# Patient Record
Sex: FEMALE | Race: OTHER | NOT HISPANIC OR LATINO | URBAN - METROPOLITAN AREA
[De-identification: names, ages, dates, MRNs, and addresses within clinical notes are randomized per-mention and may not be internally consistent; named-entity substitution may affect disease eponyms.]

---

## 2022-05-21 ENCOUNTER — EMERGENCY (EMERGENCY)
Facility: HOSPITAL | Age: 77
LOS: 1 days | Discharge: HOME | End: 2022-05-21
Attending: EMERGENCY MEDICINE
Payer: MEDICARE

## 2022-05-21 VITALS
HEART RATE: 70 BPM | TEMPERATURE: 99 F | SYSTOLIC BLOOD PRESSURE: 145 MMHG | RESPIRATION RATE: 17 BRPM | HEIGHT: 65 IN | WEIGHT: 119.93 LBS | OXYGEN SATURATION: 97 % | DIASTOLIC BLOOD PRESSURE: 74 MMHG

## 2022-05-21 LAB
ALBUMIN SERPL ELPH-MCNC: 2.8 G/DL — LOW (ref 3.5–5)
ALP SERPL-CCNC: 117 U/L — SIGNIFICANT CHANGE UP (ref 40–120)
ALT FLD-CCNC: 90 U/L DA — HIGH (ref 10–60)
ANION GAP SERPL CALC-SCNC: 5 MMOL/L — SIGNIFICANT CHANGE UP (ref 5–17)
AST SERPL-CCNC: 41 U/L — HIGH (ref 10–40)
BASOPHILS # BLD AUTO: 0.07 K/UL — SIGNIFICANT CHANGE UP (ref 0–0.2)
BASOPHILS NFR BLD AUTO: 1 % — SIGNIFICANT CHANGE UP (ref 0–2)
BILIRUB SERPL-MCNC: 0.2 MG/DL — SIGNIFICANT CHANGE UP (ref 0.2–1.2)
BUN SERPL-MCNC: 23 MG/DL — HIGH (ref 7–18)
CALCIUM SERPL-MCNC: 9.1 MG/DL — SIGNIFICANT CHANGE UP (ref 8.4–10.5)
CHLORIDE SERPL-SCNC: 109 MMOL/L — HIGH (ref 96–108)
CK SERPL-CCNC: 60 U/L — SIGNIFICANT CHANGE UP (ref 21–215)
CO2 SERPL-SCNC: 29 MMOL/L — SIGNIFICANT CHANGE UP (ref 22–31)
CREAT SERPL-MCNC: 0.95 MG/DL — SIGNIFICANT CHANGE UP (ref 0.5–1.3)
EGFR: 63 ML/MIN/1.73M2 — SIGNIFICANT CHANGE UP
EOSINOPHIL # BLD AUTO: 0.76 K/UL — HIGH (ref 0–0.5)
EOSINOPHIL NFR BLD AUTO: 11.1 % — HIGH (ref 0–6)
GLUCOSE SERPL-MCNC: 115 MG/DL — HIGH (ref 70–99)
HCT VFR BLD CALC: 34.7 % — SIGNIFICANT CHANGE UP (ref 34.5–45)
HGB BLD-MCNC: 10.9 G/DL — LOW (ref 11.5–15.5)
IMM GRANULOCYTES NFR BLD AUTO: 0.1 % — SIGNIFICANT CHANGE UP (ref 0–1.5)
LYMPHOCYTES # BLD AUTO: 1.71 K/UL — SIGNIFICANT CHANGE UP (ref 1–3.3)
LYMPHOCYTES # BLD AUTO: 24.9 % — SIGNIFICANT CHANGE UP (ref 13–44)
MCHC RBC-ENTMCNC: 31.4 GM/DL — LOW (ref 32–36)
MCHC RBC-ENTMCNC: 31.5 PG — SIGNIFICANT CHANGE UP (ref 27–34)
MCV RBC AUTO: 100.3 FL — HIGH (ref 80–100)
MONOCYTES # BLD AUTO: 0.65 K/UL — SIGNIFICANT CHANGE UP (ref 0–0.9)
MONOCYTES NFR BLD AUTO: 9.5 % — SIGNIFICANT CHANGE UP (ref 2–14)
NEUTROPHILS # BLD AUTO: 3.66 K/UL — SIGNIFICANT CHANGE UP (ref 1.8–7.4)
NEUTROPHILS NFR BLD AUTO: 53.4 % — SIGNIFICANT CHANGE UP (ref 43–77)
NRBC # BLD: 0 /100 WBCS — SIGNIFICANT CHANGE UP (ref 0–0)
PLATELET # BLD AUTO: 227 K/UL — SIGNIFICANT CHANGE UP (ref 150–400)
POTASSIUM SERPL-MCNC: 4.4 MMOL/L — SIGNIFICANT CHANGE UP (ref 3.5–5.3)
POTASSIUM SERPL-SCNC: 4.4 MMOL/L — SIGNIFICANT CHANGE UP (ref 3.5–5.3)
PROT SERPL-MCNC: 6.1 G/DL — SIGNIFICANT CHANGE UP (ref 6–8.3)
RBC # BLD: 3.46 M/UL — LOW (ref 3.8–5.2)
RBC # FLD: 13 % — SIGNIFICANT CHANGE UP (ref 10.3–14.5)
SODIUM SERPL-SCNC: 143 MMOL/L — SIGNIFICANT CHANGE UP (ref 135–145)
WBC # BLD: 6.86 K/UL — SIGNIFICANT CHANGE UP (ref 3.8–10.5)
WBC # FLD AUTO: 6.86 K/UL — SIGNIFICANT CHANGE UP (ref 3.8–10.5)

## 2022-05-21 PROCEDURE — 82550 ASSAY OF CK (CPK): CPT

## 2022-05-21 PROCEDURE — 80053 COMPREHEN METABOLIC PANEL: CPT

## 2022-05-21 PROCEDURE — 96374 THER/PROPH/DIAG INJ IV PUSH: CPT

## 2022-05-21 PROCEDURE — 73502 X-RAY EXAM HIP UNI 2-3 VIEWS: CPT | Mod: 26,LT

## 2022-05-21 PROCEDURE — 73502 X-RAY EXAM HIP UNI 2-3 VIEWS: CPT

## 2022-05-21 PROCEDURE — 36415 COLL VENOUS BLD VENIPUNCTURE: CPT

## 2022-05-21 PROCEDURE — 73552 X-RAY EXAM OF FEMUR 2/>: CPT | Mod: 26,LT

## 2022-05-21 PROCEDURE — 99284 EMERGENCY DEPT VISIT MOD MDM: CPT | Mod: 25

## 2022-05-21 PROCEDURE — 85025 COMPLETE CBC W/AUTO DIFF WBC: CPT

## 2022-05-21 PROCEDURE — 73552 X-RAY EXAM OF FEMUR 2/>: CPT

## 2022-05-21 PROCEDURE — 99284 EMERGENCY DEPT VISIT MOD MDM: CPT

## 2022-05-21 RX ORDER — KETOROLAC TROMETHAMINE 30 MG/ML
15 SYRINGE (ML) INJECTION ONCE
Refills: 0 | Status: DISCONTINUED | OUTPATIENT
Start: 2022-05-21 | End: 2022-05-21

## 2022-05-21 RX ADMIN — Medication 15 MILLIGRAM(S): at 21:15

## 2022-05-21 RX ADMIN — Medication 15 MILLIGRAM(S): at 21:45

## 2022-05-21 NOTE — ED PROVIDER NOTE - OBJECTIVE STATEMENT
76 yr old female from [facility] with hx of HTN, HLD presents to ed c/o left hip and femur pain x couple days. no trauma, no numbness or tingling. pt states worse with movement. no back pain.

## 2022-05-21 NOTE — ED PROVIDER NOTE - NEUROLOGICAL, MLM
Alert and oriented, no focal deficits, no motor or sensory deficits. ambulate with slight limp on left

## 2022-05-21 NOTE — ED PROVIDER NOTE - PROGRESS NOTE DETAILS
Chaudhry: work up neg. xr no acute fx. ck normal. normal lytes, pt neurovascularly intact  dx left leg pain possibly mild arthritis. please obtain PT and ortho as needed. fall precaution. ambulette

## 2022-05-21 NOTE — ED PROVIDER NOTE - MUSCULOSKELETAL, MLM
Spine appears normal, range of motion is not limited, no muscle. left hip pain but no deformity or erythema.

## 2022-05-21 NOTE — ED ADULT NURSE NOTE - OBJECTIVE STATEMENT
Pt presents to ED with c/o left leg pain. Pt is alert and oriented to person and place. Pt denies any discomfort.

## 2022-05-21 NOTE — ED PROVIDER NOTE - CLINICAL SUMMARY MEDICAL DECISION MAKING FREE TEXT BOX
76 yr old female from [facility] with hx of HTN, HLD presents to ed c/o left hip and femur pain x couple days. no trauma, no numbness or tingling. pt states worse with movement. no back pain.    likely OA r/o hip fx vs rhabdo- toradol, labs, xr, re-assess

## 2022-05-21 NOTE — ED ADULT NURSE NOTE - NSIMPLEMENTINTERV_GEN_ALL_ED
Implemented All Fall Risk Interventions:  [place] to call system. Call bell, personal items and telephone within reach. Instruct patient to call for assistance. Room bathroom lighting operational. Non-slip footwear when patient is off stretcher. Physically safe environment: no spills, clutter or unnecessary equipment. Stretcher in lowest position, wheels locked, appropriate side rails in place. Provide visual cue, wrist band, yellow gown, etc. Monitor gait and stability. Monitor for mental status changes and reorient to person, place, and time. Review medications for side effects contributing to fall risk. Reinforce activity limits and safety measures with patient and family.

## 2022-05-21 NOTE — ED PROVIDER NOTE - PATIENT PORTAL LINK FT
You can access the FollowMyHealth Patient Portal offered by [facility] by registering at the following website: [email or web address]. By joining [employer]’s FollowMyHealth portal, you will also be able to view your health information using other applications (apps) compatible with our system.

## 2022-05-22 VITALS
TEMPERATURE: 98 F | OXYGEN SATURATION: 98 % | SYSTOLIC BLOOD PRESSURE: 145 MMHG | DIASTOLIC BLOOD PRESSURE: 63 MMHG | HEART RATE: 63 BPM | RESPIRATION RATE: 17 BRPM

## 2022-06-04 ENCOUNTER — TELEPHONE ENCOUNTER (OUTPATIENT)
Dept: URBAN - METROPOLITAN AREA CLINIC 68 | Facility: CLINIC | Age: 77
End: 2022-06-04

## 2022-06-05 ENCOUNTER — TELEPHONE ENCOUNTER (OUTPATIENT)
Dept: URBAN - METROPOLITAN AREA CLINIC 68 | Facility: CLINIC | Age: 77
End: 2022-06-05

## 2022-06-25 ENCOUNTER — TELEPHONE ENCOUNTER (OUTPATIENT)
Age: 77
End: 2022-06-25

## 2022-06-26 ENCOUNTER — TELEPHONE ENCOUNTER (OUTPATIENT)
Age: 77
End: 2022-06-26

## 2023-03-14 ENCOUNTER — EMERGENCY (EMERGENCY)
Facility: HOSPITAL | Age: 78
LOS: 0 days | Discharge: HOME | End: 2023-03-15
Attending: STUDENT IN AN ORGANIZED HEALTH CARE EDUCATION/TRAINING PROGRAM
Payer: MEDICARE

## 2023-03-14 VITALS
RESPIRATION RATE: 20 BRPM | OXYGEN SATURATION: 98 % | HEART RATE: 74 BPM | DIASTOLIC BLOOD PRESSURE: 80 MMHG | SYSTOLIC BLOOD PRESSURE: 190 MMHG | TEMPERATURE: 98 F

## 2023-03-14 LAB
ALBUMIN SERPL ELPH-MCNC: 4.1 G/DL — SIGNIFICANT CHANGE UP (ref 3.5–5.2)
ALP SERPL-CCNC: 115 U/L — SIGNIFICANT CHANGE UP (ref 30–115)
ALT FLD-CCNC: 15 U/L — SIGNIFICANT CHANGE UP (ref 0–41)
ANION GAP SERPL CALC-SCNC: 11 MMOL/L — SIGNIFICANT CHANGE UP (ref 7–14)
AST SERPL-CCNC: 26 U/L — SIGNIFICANT CHANGE UP (ref 0–41)
BASOPHILS # BLD AUTO: 0.08 K/UL — SIGNIFICANT CHANGE UP (ref 0–0.2)
BASOPHILS NFR BLD AUTO: 1.1 % — HIGH (ref 0–1)
BILIRUB DIRECT SERPL-MCNC: <0.2 MG/DL — SIGNIFICANT CHANGE UP (ref 0–0.3)
BILIRUB INDIRECT FLD-MCNC: SIGNIFICANT CHANGE UP MG/DL (ref 0.2–1.2)
BILIRUB SERPL-MCNC: <0.2 MG/DL — SIGNIFICANT CHANGE UP (ref 0.2–1.2)
BUN SERPL-MCNC: 30 MG/DL — HIGH (ref 10–20)
CALCIUM SERPL-MCNC: 9.6 MG/DL — SIGNIFICANT CHANGE UP (ref 8.4–10.5)
CHLORIDE SERPL-SCNC: 110 MMOL/L — SIGNIFICANT CHANGE UP (ref 98–110)
CO2 SERPL-SCNC: 23 MMOL/L — SIGNIFICANT CHANGE UP (ref 17–32)
CREAT SERPL-MCNC: 0.9 MG/DL — SIGNIFICANT CHANGE UP (ref 0.7–1.5)
EGFR: 67 ML/MIN/1.73M2 — SIGNIFICANT CHANGE UP
EOSINOPHIL # BLD AUTO: 0.14 K/UL — SIGNIFICANT CHANGE UP (ref 0–0.7)
EOSINOPHIL NFR BLD AUTO: 2 % — SIGNIFICANT CHANGE UP (ref 0–8)
GLUCOSE SERPL-MCNC: 89 MG/DL — SIGNIFICANT CHANGE UP (ref 70–99)
HCT VFR BLD CALC: 36.8 % — LOW (ref 37–47)
HGB BLD-MCNC: 11.9 G/DL — LOW (ref 12–16)
IMM GRANULOCYTES NFR BLD AUTO: 0.3 % — SIGNIFICANT CHANGE UP (ref 0.1–0.3)
LIDOCAIN IGE QN: 25 U/L — SIGNIFICANT CHANGE UP (ref 7–60)
LYMPHOCYTES # BLD AUTO: 2 K/UL — SIGNIFICANT CHANGE UP (ref 1.2–3.4)
LYMPHOCYTES # BLD AUTO: 27.9 % — SIGNIFICANT CHANGE UP (ref 20.5–51.1)
MCHC RBC-ENTMCNC: 31.7 PG — HIGH (ref 27–31)
MCHC RBC-ENTMCNC: 32.3 G/DL — SIGNIFICANT CHANGE UP (ref 32–37)
MCV RBC AUTO: 98.1 FL — SIGNIFICANT CHANGE UP (ref 81–99)
MONOCYTES # BLD AUTO: 0.67 K/UL — HIGH (ref 0.1–0.6)
MONOCYTES NFR BLD AUTO: 9.4 % — HIGH (ref 1.7–9.3)
NEUTROPHILS # BLD AUTO: 4.25 K/UL — SIGNIFICANT CHANGE UP (ref 1.4–6.5)
NEUTROPHILS NFR BLD AUTO: 59.3 % — SIGNIFICANT CHANGE UP (ref 42.2–75.2)
NRBC # BLD: 0 /100 WBCS — SIGNIFICANT CHANGE UP (ref 0–0)
PLATELET # BLD AUTO: 258 K/UL — SIGNIFICANT CHANGE UP (ref 130–400)
POTASSIUM SERPL-MCNC: 5.1 MMOL/L — HIGH (ref 3.5–5)
POTASSIUM SERPL-SCNC: 5.1 MMOL/L — HIGH (ref 3.5–5)
PROT SERPL-MCNC: 6.5 G/DL — SIGNIFICANT CHANGE UP (ref 6–8)
RBC # BLD: 3.75 M/UL — LOW (ref 4.2–5.4)
RBC # FLD: 12.8 % — SIGNIFICANT CHANGE UP (ref 11.5–14.5)
SODIUM SERPL-SCNC: 144 MMOL/L — SIGNIFICANT CHANGE UP (ref 135–146)
TROPONIN T SERPL-MCNC: <0.01 NG/ML — SIGNIFICANT CHANGE UP
WBC # BLD: 7.16 K/UL — SIGNIFICANT CHANGE UP (ref 4.8–10.8)
WBC # FLD AUTO: 7.16 K/UL — SIGNIFICANT CHANGE UP (ref 4.8–10.8)

## 2023-03-14 PROCEDURE — 80048 BASIC METABOLIC PNL TOTAL CA: CPT

## 2023-03-14 PROCEDURE — 93005 ELECTROCARDIOGRAM TRACING: CPT

## 2023-03-14 PROCEDURE — 74176 CT ABD & PELVIS W/O CONTRAST: CPT | Mod: MA

## 2023-03-14 PROCEDURE — 99285 EMERGENCY DEPT VISIT HI MDM: CPT | Mod: 25

## 2023-03-14 PROCEDURE — 96374 THER/PROPH/DIAG INJ IV PUSH: CPT

## 2023-03-14 PROCEDURE — A9500: CPT

## 2023-03-14 PROCEDURE — 85025 COMPLETE CBC W/AUTO DIFF WBC: CPT

## 2023-03-14 PROCEDURE — 80076 HEPATIC FUNCTION PANEL: CPT

## 2023-03-14 PROCEDURE — 96372 THER/PROPH/DIAG INJ SC/IM: CPT | Mod: XU

## 2023-03-14 PROCEDURE — 36415 COLL VENOUS BLD VENIPUNCTURE: CPT

## 2023-03-14 PROCEDURE — G0378: CPT

## 2023-03-14 PROCEDURE — 84484 ASSAY OF TROPONIN QUANT: CPT

## 2023-03-14 PROCEDURE — 99223 1ST HOSP IP/OBS HIGH 75: CPT

## 2023-03-14 PROCEDURE — 87635 SARS-COV-2 COVID-19 AMP PRB: CPT

## 2023-03-14 PROCEDURE — 78452 HT MUSCLE IMAGE SPECT MULT: CPT | Mod: MA

## 2023-03-14 PROCEDURE — 93010 ELECTROCARDIOGRAM REPORT: CPT

## 2023-03-14 PROCEDURE — 74176 CT ABD & PELVIS W/O CONTRAST: CPT | Mod: 26,MA

## 2023-03-14 PROCEDURE — 93017 CV STRESS TEST TRACING ONLY: CPT

## 2023-03-14 PROCEDURE — 71045 X-RAY EXAM CHEST 1 VIEW: CPT | Mod: 26

## 2023-03-14 PROCEDURE — 83690 ASSAY OF LIPASE: CPT

## 2023-03-14 PROCEDURE — 93010 ELECTROCARDIOGRAM REPORT: CPT | Mod: 77

## 2023-03-14 PROCEDURE — 71045 X-RAY EXAM CHEST 1 VIEW: CPT

## 2023-03-14 RX ORDER — MIDAZOLAM HYDROCHLORIDE 1 MG/ML
2 INJECTION, SOLUTION INTRAMUSCULAR; INTRAVENOUS ONCE
Refills: 0 | Status: DISCONTINUED | OUTPATIENT
Start: 2023-03-14 | End: 2023-03-14

## 2023-03-14 RX ORDER — ASPIRIN/CALCIUM CARB/MAGNESIUM 324 MG
81 TABLET ORAL DAILY
Refills: 0 | Status: DISCONTINUED | OUTPATIENT
Start: 2023-03-14 | End: 2023-03-15

## 2023-03-14 RX ORDER — SERTRALINE 25 MG/1
25 TABLET, FILM COATED ORAL DAILY
Refills: 0 | Status: DISCONTINUED | OUTPATIENT
Start: 2023-03-14 | End: 2023-03-15

## 2023-03-14 RX ORDER — AMLODIPINE BESYLATE 2.5 MG/1
10 TABLET ORAL ONCE
Refills: 0 | Status: DISCONTINUED | OUTPATIENT
Start: 2023-03-14 | End: 2023-03-15

## 2023-03-14 RX ORDER — LOSARTAN POTASSIUM 100 MG/1
50 TABLET, FILM COATED ORAL DAILY
Refills: 0 | Status: DISCONTINUED | OUTPATIENT
Start: 2023-03-14 | End: 2023-03-15

## 2023-03-14 RX ORDER — DIAZEPAM 5 MG
5 TABLET ORAL ONCE
Refills: 0 | Status: DISCONTINUED | OUTPATIENT
Start: 2023-03-14 | End: 2023-03-14

## 2023-03-14 RX ORDER — DONEPEZIL HYDROCHLORIDE 10 MG/1
10 TABLET, FILM COATED ORAL AT BEDTIME
Refills: 0 | Status: DISCONTINUED | OUTPATIENT
Start: 2023-03-14 | End: 2023-03-15

## 2023-03-14 RX ADMIN — MIDAZOLAM HYDROCHLORIDE 2 MILLIGRAM(S): 1 INJECTION, SOLUTION INTRAMUSCULAR; INTRAVENOUS at 21:17

## 2023-03-14 RX ADMIN — Medication 5 MILLIGRAM(S): at 19:55

## 2023-03-14 RX ADMIN — MIDAZOLAM HYDROCHLORIDE 2 MILLIGRAM(S): 1 INJECTION, SOLUTION INTRAMUSCULAR; INTRAVENOUS at 22:06

## 2023-03-14 NOTE — ED CDU PROVIDER INITIAL DAY NOTE - CLINICAL SUMMARY MEDICAL DECISION MAKING FREE TEXT BOX
Labs and EKG were ordered and reviewed.  Imaging was ordered and reviewed by me.  Appropriate medications for patient's presenting complaints were ordered and effects were reassessed.  Patient's records (prior hospital, ED visit, and/or nursing home notes if available) were reviewed.  Additional history was obtained from EMS, family, and/or PCP (where available).  Escalation to admission/observation was considered.  Patient requires hospitalization - monitored setting in obs for full cardiac workup.

## 2023-03-14 NOTE — ED PROVIDER NOTE - PROGRESS NOTE DETAILS
patient's son, Eb: [phone] patient's son, Husam: [phone]    discussed case with patient's son, husam, who states patient with chronic abd pain/back pain. patient without hx cardiac workup. non-smoker. will place in obs for stress CK: patient's son, Husam: [phone]    discussed case with patient's son, husam, who states patient with chronic abd pain/back pain. patient without hx cardiac workup. non-smoker. will place in obs for stress

## 2023-03-14 NOTE — ED PROVIDER NOTE - OBJECTIVE STATEMENT
77 year old female, past medical history htn, depression, dementia, bib ems from assisted living with abd pain. patient reports chest pain radiating to abd pain. as per son, patient with chronic abd pain/back pain. no hx cardiac workup. hx hysterctomy. no fever, vomiting, diarrhea, syncope.

## 2023-03-14 NOTE — ED PROVIDER NOTE - CLINICAL SUMMARY MEDICAL DECISION MAKING FREE TEXT BOX
77-year-old female with past medical history of HTN, HLD, CKD, and Alzheimer's (from nursing home), hysterectomy presents to the ED for chest pain and upper abdominal pain.  Patient very poor historian and majority of history from nursing home notes and also from son (LUDIVINA Parson spoke to him).  No history of fever, shortness of breath, cough, nausea/vomiting/diarrhea. dizziness, leg swelling, calf tenderness,.  Patient apparently with chronic back problems.  Patient with no history of a cardiac work-up.    Patient began sundowning while in the ER.  Given benzodiazepines as she became very agitated, aggressive with nursing staff, trying to pull things off her body (clothing, IV) and therefore given a benzodiazepine to calm down her agitation.    CT abdomen pelvis negative for acute pathology.  ACS work-up initiated and would recommend patient be placed in OBS for completion of the workup, with stress testing, as she has never had a cardiac work-up in the past.

## 2023-03-14 NOTE — ED ADULT TRIAGE NOTE - CHIEF COMPLAINT QUOTE
BIBA from assisted living for chest pain that began today, pt has dementia, aox2, disoriented to person

## 2023-03-14 NOTE — ED PROVIDER NOTE - ATTENDING APP SHARED VISIT CONTRIBUTION OF CARE
77-year-old female with past medical history of HTN, HLD, CKD, and Alzheimer's (from nursing home), hysterectomy presents to the ED for chest pain and upper abdominal pain.  Patient very poor historian and majority of history from nursing home notes and also from son (LUDIVINA Parson spoke to him).  No history of fever, shortness of breath, cough, nausea/vomiting/diarrhea. dizziness, leg swelling, calf tenderness,.  Patient apparently with chronic back problems.  Patient with no history of a cardiac work-up.    On exam patient with + epigastric and left upper quadrant tenderness, no mass, no rebound or guarding, + BS, nondistended.  Remainder exam as per PA note.  Agree with management.  Will check labs, EKG, x-ray, CT scan abdomen pelvis, UA and reassess

## 2023-03-14 NOTE — ED PROVIDER NOTE - PHYSICAL EXAMINATION
CONSTITUTIONAL: agitated, elderly appearing female, nad  SKIN: skin exam is warm and dry  EYES: PERRL, conjunctiva and sclera clear.  ENT: MMM  NECK:  ROM intact.  CARD: S1, S2 normal, no murmur  RESP: No wheezes, rales or rhonchi. Good air movement bilaterally  ABD: soft; non-distended; +epigastric, +LUQ TTP, no rebound/guarding  EXT: Normal ROM.   NEURO: awake, following commands, no focal deficits

## 2023-03-15 VITALS
OXYGEN SATURATION: 98 % | SYSTOLIC BLOOD PRESSURE: 124 MMHG | RESPIRATION RATE: 18 BRPM | HEART RATE: 75 BPM | DIASTOLIC BLOOD PRESSURE: 54 MMHG

## 2023-03-15 LAB
SARS-COV-2 RNA SPEC QL NAA+PROBE: SIGNIFICANT CHANGE UP
TROPONIN T SERPL-MCNC: <0.01 NG/ML — SIGNIFICANT CHANGE UP

## 2023-03-15 PROCEDURE — 78452 HT MUSCLE IMAGE SPECT MULT: CPT | Mod: 26,MA

## 2023-03-15 PROCEDURE — 93018 CV STRESS TEST I&R ONLY: CPT

## 2023-03-15 PROCEDURE — 93016 CV STRESS TEST SUPVJ ONLY: CPT

## 2023-03-15 PROCEDURE — 99238 HOSP IP/OBS DSCHRG MGMT 30/<: CPT

## 2023-03-15 RX ORDER — REGADENOSON 0.08 MG/ML
0.4 INJECTION, SOLUTION INTRAVENOUS ONCE
Refills: 0 | Status: DISCONTINUED | OUTPATIENT
Start: 2023-03-15 | End: 2023-03-15

## 2023-03-15 RX ORDER — ADENOSINE 3 MG/ML
60 INJECTION INTRAVENOUS ONCE
Refills: 0 | Status: DISCONTINUED | OUTPATIENT
Start: 2023-03-15 | End: 2023-03-15

## 2023-03-15 RX ADMIN — Medication 81 MILLIGRAM(S): at 14:12

## 2023-03-15 RX ADMIN — SERTRALINE 25 MILLIGRAM(S): 25 TABLET, FILM COATED ORAL at 14:12

## 2023-03-15 NOTE — ED CDU PROVIDER DISPOSITION NOTE - PROVIDER TOKENS
FREE:[LAST:[follow up with your primary medical doctor],PHONE:[(   )    -],FAX:[(   )    -],FOLLOWUP:[4-6 Days]],PROVIDER:[TOKEN:[[id number]],FOLLOWUP:[4-6 Days]]

## 2023-03-15 NOTE — ED CDU PROVIDER SUBSEQUENT DAY NOTE - CLINICAL SUMMARY MEDICAL DECISION MAKING FREE TEXT BOX
Labs and EKG were ordered and reviewed.  Imaging was ordered and reviewed by me.  Appropriate medications for patient's presenting complaints were ordered and effects were reassessed.  Patient's records (prior hospital, ED visit, and/or nursing home notes if available) were reviewed.  Additional history was obtained from EMS, family, and/or PCP (where available).  Escalation to admission/observation was considered.  Patient requires hospitalization - monitored setting in obs unit for full cardiac workup.  Patient remains stable.

## 2023-03-15 NOTE — ED CDU PROVIDER DISPOSITION NOTE - CARE PROVIDERS DIRECT ADDRESSES
,DirectAddress_Unknown,[email or web address]

## 2023-03-15 NOTE — ED CDU PROVIDER DISPOSITION NOTE - PATIENT PORTAL LINK FT
You can access the FollowMyHealth Patient Portal offered by [facility] by registering at the following website: [email or web address]. By joining [employer]’s FollowMyHealth portal, you will also be able to view your health information using other applications (apps) compatible with our system.

## 2023-03-15 NOTE — ED CDU PROVIDER DISPOSITION NOTE - CLINICAL COURSE
77 year old female, past medical history htn, depression, dementia, bib ems from assisted living with abd pain. patient reports chest pain radiating to abd pain. as per son, patient with chronic abd pain/back pain. no hx cardiac workup. hx hysterectomy. no fever, vomiting, diarrhea, syncope.  Patient's workup in ED was normal.  She was medically stable for discharge.

## 2023-03-15 NOTE — ED ADULT NURSE REASSESSMENT NOTE - NS ED NURSE REASSESS COMMENT FT1
Pt assessed. A/Ox1. VSS. No s/s of pain at this time. Cardiac monitor at bedside. Safety precautions. side rails up. bed alarm set.

## 2023-03-15 NOTE — ED CDU PROVIDER DISPOSITION NOTE - CARE PROVIDER_API CALL
follow up with your primary medical doctor,   Phone: (   )    -  Fax: (   )    -  Follow Up Time: 4-6 Days    Milind Wagner)  Cardiovascular Disease; Internal Medicine; Interventional Cardiology; Nuclear Cardiology  [address] [place] [address]  Phone: [phone]  Fax: [phone]  Follow Up Time: 4-6 Days

## 2023-03-16 DIAGNOSIS — Z20.822 CONTACT WITH AND (SUSPECTED) EXPOSURE TO COVID-19: ICD-10-CM

## 2023-03-16 DIAGNOSIS — F32.A DEPRESSION, UNSPECIFIED: ICD-10-CM

## 2023-03-16 DIAGNOSIS — Z90.710 ACQUIRED ABSENCE OF BOTH CERVIX AND UTERUS: ICD-10-CM

## 2023-03-16 DIAGNOSIS — N18.9 CHRONIC KIDNEY DISEASE, UNSPECIFIED: ICD-10-CM

## 2023-03-16 DIAGNOSIS — I12.9 HYPERTENSIVE CHRONIC KIDNEY DISEASE WITH STAGE 1 THROUGH STAGE 4 CHRONIC KIDNEY DISEASE, OR UNSPECIFIED CHRONIC KIDNEY DISEASE: ICD-10-CM

## 2023-03-16 DIAGNOSIS — R07.9 CHEST PAIN, UNSPECIFIED: ICD-10-CM

## 2023-03-16 DIAGNOSIS — R10.13 EPIGASTRIC PAIN: ICD-10-CM

## 2023-03-16 DIAGNOSIS — F02.80 DEMENTIA IN OTHER DISEASES CLASSIFIED ELSEWHERE, UNSPECIFIED SEVERITY, WITHOUT BEHAVIORAL DISTURBANCE, PSYCHOTIC DISTURBANCE, MOOD DISTURBANCE, AND ANXIETY: ICD-10-CM

## 2023-03-16 DIAGNOSIS — G30.9 ALZHEIMER'S DISEASE, UNSPECIFIED: ICD-10-CM

## 2023-03-16 DIAGNOSIS — R07.89 OTHER CHEST PAIN: ICD-10-CM

## 2023-05-04 ENCOUNTER — EMERGENCY (EMERGENCY)
Facility: HOSPITAL | Age: 78
LOS: 0 days | End: 2023-05-05
Attending: EMERGENCY MEDICINE
Payer: MEDICARE

## 2023-05-04 VITALS
RESPIRATION RATE: 18 BRPM | WEIGHT: 125 LBS | OXYGEN SATURATION: 96 % | SYSTOLIC BLOOD PRESSURE: 121 MMHG | DIASTOLIC BLOOD PRESSURE: 58 MMHG | TEMPERATURE: 98 F | HEART RATE: 76 BPM | HEIGHT: 65 IN

## 2023-05-04 DIAGNOSIS — I10 ESSENTIAL (PRIMARY) HYPERTENSION: ICD-10-CM

## 2023-05-04 DIAGNOSIS — R30.0 DYSURIA: ICD-10-CM

## 2023-05-04 DIAGNOSIS — F32.A DEPRESSION, UNSPECIFIED: ICD-10-CM

## 2023-05-04 DIAGNOSIS — F02.80 DEMENTIA IN OTHER DISEASES CLASSIFIED ELSEWHERE, UNSPECIFIED SEVERITY, WITHOUT BEHAVIORAL DISTURBANCE, PSYCHOTIC DISTURBANCE, MOOD DISTURBANCE, AND ANXIETY: ICD-10-CM

## 2023-05-04 DIAGNOSIS — N39.0 URINARY TRACT INFECTION, SITE NOT SPECIFIED: ICD-10-CM

## 2023-05-04 DIAGNOSIS — G30.9 ALZHEIMER'S DISEASE, UNSPECIFIED: ICD-10-CM

## 2023-05-04 LAB
APPEARANCE UR: ABNORMAL
BILIRUB UR-MCNC: NEGATIVE — SIGNIFICANT CHANGE UP
COLOR SPEC: YELLOW — SIGNIFICANT CHANGE UP
DIFF PNL FLD: ABNORMAL
GLUCOSE UR QL: NEGATIVE — SIGNIFICANT CHANGE UP
KETONES UR-MCNC: SIGNIFICANT CHANGE UP
LEUKOCYTE ESTERASE UR-ACNC: ABNORMAL
NITRITE UR-MCNC: NEGATIVE — SIGNIFICANT CHANGE UP
PH UR: 6 — SIGNIFICANT CHANGE UP (ref 5–8)
PROT UR-MCNC: ABNORMAL
SP GR SPEC: 1.02 — SIGNIFICANT CHANGE UP (ref 1.01–1.03)
UROBILINOGEN FLD QL: ABNORMAL

## 2023-05-04 PROCEDURE — 99285 EMERGENCY DEPT VISIT HI MDM: CPT

## 2023-05-04 PROCEDURE — 81001 URINALYSIS AUTO W/SCOPE: CPT

## 2023-05-04 PROCEDURE — 87086 URINE CULTURE/COLONY COUNT: CPT

## 2023-05-04 PROCEDURE — 99284 EMERGENCY DEPT VISIT MOD MDM: CPT

## 2023-05-04 RX ORDER — CEFPODOXIME PROXETIL 100 MG
200 TABLET ORAL ONCE
Refills: 0 | Status: COMPLETED | OUTPATIENT
Start: 2023-05-04 | End: 2023-05-04

## 2023-05-04 RX ORDER — CEFPODOXIME PROXETIL 100 MG
1 TABLET ORAL
Qty: 14 | Refills: 0
Start: 2023-05-04 | End: 2023-05-10

## 2023-05-04 RX ORDER — CEFPODOXIME PROXETIL 100 MG
100 TABLET ORAL ONCE
Refills: 0 | Status: DISCONTINUED | OUTPATIENT
Start: 2023-05-04 | End: 2023-05-04

## 2023-05-04 RX ADMIN — Medication 200 MILLIGRAM(S): at 20:50

## 2023-05-04 NOTE — ED PROVIDER NOTE - PROGRESS NOTE DETAILS
d/w rn supervisor Ashlie and reports no other concerns, comfortable with dc back with abx rx. attempted to call family as well, no ans.

## 2023-05-04 NOTE — ED PROVIDER NOTE - PATIENT PORTAL LINK FT
You can access the FollowMyHealth Patient Portal offered by [facility] by registering at the following website: [email or web address]. By joining [employer]’s FollowMyHealth portal, you will also be able to view your health information using other applications (apps) compatible with our system.

## 2023-05-04 NOTE — ED PROVIDER NOTE - PHYSICAL EXAMINATION
CONSTITUTIONAL: Well-appearing; well-nourished; in no apparent distress.   NECK: Supple; non-tender; no cervical lymphadenopathy. No JVD.   CARDIOVASCULAR: Normal S1, S2; no murmurs, rubs, or gallops.   RESPIRATORY: Normal chest excursion with respiration; breath sounds clear and equal bilaterally; no wheezes, rhonchi, or rales.  GI/: Non-distended; non-tender; no palpable organomegaly.   MS: No evidence of trauma or deformity. Normal ROM in all four extremities; non-tender to palpation; distal pulses are normal.   SKIN: Normal for age and race; warm; dry; good turgor; no apparent lesions or exudate.   NEURO/PSYCH: A & O x 1; baseline per NH supervisor Ashlie

## 2023-05-04 NOTE — ED ADULT TRIAGE NOTE - CHIEF COMPLAINT QUOTE
Pt  BIBEMS from [facility] for UTI symptoms, per EMS staff told them that every time she urinates she screams, pt has hx of dementia., poor historian.

## 2023-05-04 NOTE — ED PROVIDER NOTE - ATTENDING APP SHARED VISIT CONTRIBUTION OF CARE
78 yo F [facility] of alzheimer dementia, HTN, HLD presents with urinary symptoms. As per staff at nursing home patient has been screaming with urination. Has advanced dementia so unable to provide a hx. Patient is on a 1:1 at her facility. Nursing states that patient has been at her baseline.     CONSTITUTIONAL: Well-developed; well-nourished; in no acute distress.   SKIN: warm, dry  HEAD: Normocephalic; atraumatic.  EYES: PERRL, EOMI, no conjunctival erythema  ENT: No nasal discharge; airway clear.  NECK: Supple; non tender.  CARD: S1, S2 normal;  Regular rate and rhythm.   RESP: No wheezes, rales or rhonchi.  ABD: soft non tender, non distended, no rebound or guarding  EXT: Normal ROM.  5/5 strength in all 4 extremities   LYMPH: No acute cervical adenopathy.  NEURO: A&Ox1, neurovascularly intact  PSYCH: Cooperative, appropriate.

## 2023-05-04 NOTE — ED PROVIDER NOTE - OBJECTIVE STATEMENT
pt sent from Assisted Living for eval of dysuria. pt with Alzheimers dementia requiring 1:1 at their facility and her aide reported that she began crying out in pain when urinating today.

## 2023-05-04 NOTE — ED ADULT NURSE NOTE - OBJECTIVE STATEMENT
patient sent from nursing home for pain on urination. patient noted repeatedly getting up and attempting wandering around ED, confused. patient has hx of dementia as per transfer paper.

## 2023-05-04 NOTE — ED ADULT NURSE NOTE - NSIMPLEMENTINTERV_GEN_ALL_ED
Implemented All Fall with Harm Risk Interventions:  [place] to call system. Call bell, personal items and telephone within reach. Instruct patient to call for assistance. Room bathroom lighting operational. Non-slip footwear when patient is off stretcher. Physically safe environment: no spills, clutter or unnecessary equipment. Stretcher in lowest position, wheels locked, appropriate side rails in place. Provide visual cue, wrist band, yellow gown, etc. Monitor gait and stability. Monitor for mental status changes and reorient to person, place, and time. Review medications for side effects contributing to fall risk. Reinforce activity limits and safety measures with patient and family. Provide visual clues: red socks.

## 2023-05-04 NOTE — ED PROVIDER NOTE - CLINICAL SUMMARY MEDICAL DECISION MAKING FREE TEXT BOX
Patient presents with pain with urination. + UTI found. Abx given. Sent back to nursing home with abx and pmd follow up. Return precautions discussed.

## 2023-05-04 NOTE — ED ADULT NURSE NOTE - CHIEF COMPLAINT QUOTE
Pt  BIBEMS from [facility] for UTI symptoms, per EMS staff told them that every time she urinates she screams, pt has hx of dementia., poor historian.

## 2023-05-05 PROBLEM — Z86.59 PERSONAL HISTORY OF OTHER MENTAL AND BEHAVIORAL DISORDERS: Chronic | Status: ACTIVE | Noted: 2023-03-15

## 2023-05-05 PROBLEM — I10 ESSENTIAL (PRIMARY) HYPERTENSION: Chronic | Status: ACTIVE | Noted: 2023-03-15

## 2023-05-05 PROBLEM — F03.90 UNSPECIFIED DEMENTIA, UNSPECIFIED SEVERITY, WITHOUT BEHAVIORAL DISTURBANCE, PSYCHOTIC DISTURBANCE, MOOD DISTURBANCE, AND ANXIETY: Chronic | Status: ACTIVE | Noted: 2023-03-15

## 2023-05-06 LAB
CULTURE RESULTS: SIGNIFICANT CHANGE UP
SPECIMEN SOURCE: SIGNIFICANT CHANGE UP

## 2023-05-07 ENCOUNTER — INPATIENT (INPATIENT)
Facility: HOSPITAL | Age: 78
LOS: 2 days | Discharge: NURSING, REHAB OR LONG-TERM CARE | DRG: 552 | End: 2023-05-10
Attending: INTERNAL MEDICINE | Admitting: INTERNAL MEDICINE
Payer: MEDICARE

## 2023-05-07 VITALS
TEMPERATURE: 99 F | SYSTOLIC BLOOD PRESSURE: 107 MMHG | HEIGHT: 65 IN | RESPIRATION RATE: 20 BRPM | DIASTOLIC BLOOD PRESSURE: 50 MMHG | HEART RATE: 63 BPM | OXYGEN SATURATION: 99 %

## 2023-05-07 DIAGNOSIS — M54.9 DORSALGIA, UNSPECIFIED: ICD-10-CM

## 2023-05-07 LAB
ALBUMIN SERPL ELPH-MCNC: 3.8 G/DL — SIGNIFICANT CHANGE UP (ref 3.5–5.2)
ALP SERPL-CCNC: 79 U/L — SIGNIFICANT CHANGE UP (ref 30–115)
ALT FLD-CCNC: 11 U/L — SIGNIFICANT CHANGE UP (ref 0–41)
ANION GAP SERPL CALC-SCNC: 10 MMOL/L — SIGNIFICANT CHANGE UP (ref 7–14)
APPEARANCE UR: CLEAR — SIGNIFICANT CHANGE UP
AST SERPL-CCNC: 19 U/L — SIGNIFICANT CHANGE UP (ref 0–41)
BASOPHILS # BLD AUTO: 0.07 K/UL — SIGNIFICANT CHANGE UP (ref 0–0.2)
BASOPHILS NFR BLD AUTO: 1 % — SIGNIFICANT CHANGE UP (ref 0–1)
BILIRUB DIRECT SERPL-MCNC: <0.2 MG/DL — SIGNIFICANT CHANGE UP (ref 0–0.3)
BILIRUB INDIRECT FLD-MCNC: SIGNIFICANT CHANGE UP MG/DL (ref 0.2–1.2)
BILIRUB SERPL-MCNC: 0.3 MG/DL — SIGNIFICANT CHANGE UP (ref 0.2–1.2)
BILIRUB UR-MCNC: NEGATIVE — SIGNIFICANT CHANGE UP
BUN SERPL-MCNC: 20 MG/DL — SIGNIFICANT CHANGE UP (ref 10–20)
CALCIUM SERPL-MCNC: 9.7 MG/DL — SIGNIFICANT CHANGE UP (ref 8.4–10.4)
CHLORIDE SERPL-SCNC: 109 MMOL/L — SIGNIFICANT CHANGE UP (ref 98–110)
CO2 SERPL-SCNC: 24 MMOL/L — SIGNIFICANT CHANGE UP (ref 17–32)
COLOR SPEC: COLORLESS — SIGNIFICANT CHANGE UP
CREAT SERPL-MCNC: 1 MG/DL — SIGNIFICANT CHANGE UP (ref 0.7–1.5)
DIFF PNL FLD: NEGATIVE — SIGNIFICANT CHANGE UP
EGFR: 59 ML/MIN/1.73M2 — LOW
EOSINOPHIL # BLD AUTO: 0.25 K/UL — SIGNIFICANT CHANGE UP (ref 0–0.7)
EOSINOPHIL NFR BLD AUTO: 3.4 % — SIGNIFICANT CHANGE UP (ref 0–8)
GLUCOSE BLDC GLUCOMTR-MCNC: 105 MG/DL — HIGH (ref 70–99)
GLUCOSE BLDC GLUCOMTR-MCNC: 96 MG/DL — SIGNIFICANT CHANGE UP (ref 70–99)
GLUCOSE SERPL-MCNC: 111 MG/DL — HIGH (ref 70–99)
GLUCOSE UR QL: NEGATIVE — SIGNIFICANT CHANGE UP
HCT VFR BLD CALC: 35.3 % — LOW (ref 37–47)
HGB BLD-MCNC: 11.3 G/DL — LOW (ref 12–16)
IMM GRANULOCYTES NFR BLD AUTO: 0.4 % — HIGH (ref 0.1–0.3)
KETONES UR-MCNC: NEGATIVE — SIGNIFICANT CHANGE UP
LEUKOCYTE ESTERASE UR-ACNC: NEGATIVE — SIGNIFICANT CHANGE UP
LIDOCAIN IGE QN: 20 U/L — SIGNIFICANT CHANGE UP (ref 7–60)
LYMPHOCYTES # BLD AUTO: 2.47 K/UL — SIGNIFICANT CHANGE UP (ref 1.2–3.4)
LYMPHOCYTES # BLD AUTO: 33.6 % — SIGNIFICANT CHANGE UP (ref 20.5–51.1)
MCHC RBC-ENTMCNC: 30.8 PG — SIGNIFICANT CHANGE UP (ref 27–31)
MCHC RBC-ENTMCNC: 32 G/DL — SIGNIFICANT CHANGE UP (ref 32–37)
MCV RBC AUTO: 96.2 FL — SIGNIFICANT CHANGE UP (ref 81–99)
MONOCYTES # BLD AUTO: 0.6 K/UL — SIGNIFICANT CHANGE UP (ref 0.1–0.6)
MONOCYTES NFR BLD AUTO: 8.2 % — SIGNIFICANT CHANGE UP (ref 1.7–9.3)
NEUTROPHILS # BLD AUTO: 3.93 K/UL — SIGNIFICANT CHANGE UP (ref 1.4–6.5)
NEUTROPHILS NFR BLD AUTO: 53.4 % — SIGNIFICANT CHANGE UP (ref 42.2–75.2)
NITRITE UR-MCNC: NEGATIVE — SIGNIFICANT CHANGE UP
NRBC # BLD: 0 /100 WBCS — SIGNIFICANT CHANGE UP (ref 0–0)
PH UR: 6.5 — SIGNIFICANT CHANGE UP (ref 5–8)
PLATELET # BLD AUTO: 256 K/UL — SIGNIFICANT CHANGE UP (ref 130–400)
PMV BLD: 10.4 FL — SIGNIFICANT CHANGE UP (ref 7.4–10.4)
POTASSIUM SERPL-MCNC: 4.7 MMOL/L — SIGNIFICANT CHANGE UP (ref 3.5–5)
POTASSIUM SERPL-SCNC: 4.7 MMOL/L — SIGNIFICANT CHANGE UP (ref 3.5–5)
PROT SERPL-MCNC: 5.8 G/DL — LOW (ref 6–8)
PROT UR-MCNC: NEGATIVE — SIGNIFICANT CHANGE UP
RBC # BLD: 3.67 M/UL — LOW (ref 4.2–5.4)
RBC # FLD: 12.7 % — SIGNIFICANT CHANGE UP (ref 11.5–14.5)
SODIUM SERPL-SCNC: 143 MMOL/L — SIGNIFICANT CHANGE UP (ref 135–146)
SP GR SPEC: 1.01 — LOW (ref 1.01–1.03)
TROPONIN T SERPL-MCNC: <0.01 NG/ML — SIGNIFICANT CHANGE UP
UROBILINOGEN FLD QL: SIGNIFICANT CHANGE UP
WBC # BLD: 7.35 K/UL — SIGNIFICANT CHANGE UP (ref 4.8–10.8)
WBC # FLD AUTO: 7.35 K/UL — SIGNIFICANT CHANGE UP (ref 4.8–10.8)

## 2023-05-07 PROCEDURE — 80053 COMPREHEN METABOLIC PANEL: CPT

## 2023-05-07 PROCEDURE — 99497 ADVNCD CARE PLAN 30 MIN: CPT | Mod: 25

## 2023-05-07 PROCEDURE — 99222 1ST HOSP IP/OBS MODERATE 55: CPT

## 2023-05-07 PROCEDURE — 97163 PT EVAL HIGH COMPLEX 45 MIN: CPT | Mod: GP

## 2023-05-07 PROCEDURE — 92610 EVALUATE SWALLOWING FUNCTION: CPT | Mod: GN

## 2023-05-07 PROCEDURE — 83036 HEMOGLOBIN GLYCOSYLATED A1C: CPT

## 2023-05-07 PROCEDURE — 93010 ELECTROCARDIOGRAM REPORT: CPT

## 2023-05-07 PROCEDURE — 85025 COMPLETE CBC W/AUTO DIFF WBC: CPT

## 2023-05-07 PROCEDURE — 74176 CT ABD & PELVIS W/O CONTRAST: CPT | Mod: 26,MA

## 2023-05-07 PROCEDURE — 85045 AUTOMATED RETICULOCYTE COUNT: CPT

## 2023-05-07 PROCEDURE — 82962 GLUCOSE BLOOD TEST: CPT

## 2023-05-07 PROCEDURE — 36415 COLL VENOUS BLD VENIPUNCTURE: CPT

## 2023-05-07 PROCEDURE — 80061 LIPID PANEL: CPT

## 2023-05-07 PROCEDURE — 83540 ASSAY OF IRON: CPT

## 2023-05-07 PROCEDURE — 82728 ASSAY OF FERRITIN: CPT

## 2023-05-07 PROCEDURE — 93005 ELECTROCARDIOGRAM TRACING: CPT

## 2023-05-07 PROCEDURE — 83735 ASSAY OF MAGNESIUM: CPT

## 2023-05-07 PROCEDURE — 99285 EMERGENCY DEPT VISIT HI MDM: CPT

## 2023-05-07 PROCEDURE — U0003: CPT

## 2023-05-07 PROCEDURE — U0005: CPT

## 2023-05-07 PROCEDURE — 83550 IRON BINDING TEST: CPT

## 2023-05-07 PROCEDURE — 86803 HEPATITIS C AB TEST: CPT

## 2023-05-07 RX ORDER — DOCUSATE SODIUM 100 MG
1 CAPSULE ORAL
Refills: 0 | DISCHARGE

## 2023-05-07 RX ORDER — POLYETHYLENE GLYCOL 3350 17 G/17G
17 POWDER, FOR SOLUTION ORAL DAILY
Refills: 0 | Status: DISCONTINUED | OUTPATIENT
Start: 2023-05-07 | End: 2023-05-10

## 2023-05-07 RX ORDER — HEPARIN SODIUM 5000 [USP'U]/ML
5000 INJECTION INTRAVENOUS; SUBCUTANEOUS EVERY 8 HOURS
Refills: 0 | Status: DISCONTINUED | OUTPATIENT
Start: 2023-05-07 | End: 2023-05-10

## 2023-05-07 RX ORDER — AMLODIPINE BESYLATE 2.5 MG/1
1 TABLET ORAL
Refills: 0 | DISCHARGE

## 2023-05-07 RX ORDER — PREGABALIN 225 MG/1
1 CAPSULE ORAL
Refills: 0 | DISCHARGE

## 2023-05-07 RX ORDER — ATORVASTATIN CALCIUM 80 MG/1
20 TABLET, FILM COATED ORAL AT BEDTIME
Refills: 0 | Status: DISCONTINUED | OUTPATIENT
Start: 2023-05-07 | End: 2023-05-10

## 2023-05-07 RX ORDER — HALOPERIDOL DECANOATE 100 MG/ML
1 INJECTION INTRAMUSCULAR ONCE
Refills: 0 | Status: COMPLETED | OUTPATIENT
Start: 2023-05-07 | End: 2023-05-07

## 2023-05-07 RX ORDER — PANTOPRAZOLE SODIUM 20 MG/1
40 TABLET, DELAYED RELEASE ORAL
Refills: 0 | Status: DISCONTINUED | OUTPATIENT
Start: 2023-05-07 | End: 2023-05-10

## 2023-05-07 RX ORDER — POLYETHYLENE GLYCOL 3350 17 G/17G
17 POWDER, FOR SOLUTION ORAL
Refills: 0 | DISCHARGE

## 2023-05-07 RX ORDER — ALENDRONATE SODIUM 70 MG/1
1 TABLET ORAL
Refills: 0 | DISCHARGE

## 2023-05-07 RX ORDER — ACETAMINOPHEN 500 MG
650 TABLET ORAL EVERY 6 HOURS
Refills: 0 | Status: DISCONTINUED | OUTPATIENT
Start: 2023-05-07 | End: 2023-05-10

## 2023-05-07 RX ORDER — ATORVASTATIN CALCIUM 80 MG/1
1 TABLET, FILM COATED ORAL
Refills: 0 | DISCHARGE

## 2023-05-07 RX ORDER — SERTRALINE 25 MG/1
1 TABLET, FILM COATED ORAL
Refills: 0 | DISCHARGE

## 2023-05-07 RX ORDER — AMLODIPINE BESYLATE 2.5 MG/1
10 TABLET ORAL DAILY
Refills: 0 | Status: DISCONTINUED | OUTPATIENT
Start: 2023-05-07 | End: 2023-05-10

## 2023-05-07 RX ORDER — DONEPEZIL HYDROCHLORIDE 10 MG/1
1 TABLET, FILM COATED ORAL
Refills: 0 | DISCHARGE

## 2023-05-07 RX ORDER — METFORMIN HYDROCHLORIDE 850 MG/1
1 TABLET ORAL
Refills: 0 | DISCHARGE

## 2023-05-07 RX ORDER — PANTOPRAZOLE SODIUM 20 MG/1
1 TABLET, DELAYED RELEASE ORAL
Refills: 0 | DISCHARGE

## 2023-05-07 RX ORDER — CHLORHEXIDINE GLUCONATE 213 G/1000ML
1 SOLUTION TOPICAL
Refills: 0 | Status: DISCONTINUED | OUTPATIENT
Start: 2023-05-07 | End: 2023-05-10

## 2023-05-07 RX ORDER — DONEPEZIL HYDROCHLORIDE 10 MG/1
10 TABLET, FILM COATED ORAL AT BEDTIME
Refills: 0 | Status: DISCONTINUED | OUTPATIENT
Start: 2023-05-07 | End: 2023-05-10

## 2023-05-07 RX ORDER — GABAPENTIN 400 MG/1
100 CAPSULE ORAL THREE TIMES A DAY
Refills: 0 | Status: DISCONTINUED | OUTPATIENT
Start: 2023-05-07 | End: 2023-05-10

## 2023-05-07 RX ORDER — ACETAMINOPHEN 500 MG
650 TABLET ORAL EVERY 6 HOURS
Refills: 0 | Status: DISCONTINUED | OUTPATIENT
Start: 2023-05-07 | End: 2023-05-07

## 2023-05-07 RX ORDER — ASPIRIN/CALCIUM CARB/MAGNESIUM 324 MG
81 TABLET ORAL DAILY
Refills: 0 | Status: DISCONTINUED | OUTPATIENT
Start: 2023-05-07 | End: 2023-05-10

## 2023-05-07 RX ORDER — MISOPROSTOL 200 UG/1
1 TABLET ORAL
Refills: 0 | DISCHARGE

## 2023-05-07 RX ORDER — SERTRALINE 25 MG/1
50 TABLET, FILM COATED ORAL DAILY
Refills: 0 | Status: DISCONTINUED | OUTPATIENT
Start: 2023-05-07 | End: 2023-05-10

## 2023-05-07 RX ORDER — PREGABALIN 225 MG/1
1000 CAPSULE ORAL DAILY
Refills: 0 | Status: DISCONTINUED | OUTPATIENT
Start: 2023-05-07 | End: 2023-05-10

## 2023-05-07 RX ORDER — ASPIRIN/CALCIUM CARB/MAGNESIUM 324 MG
1 TABLET ORAL
Refills: 0 | DISCHARGE

## 2023-05-07 RX ADMIN — Medication 2 MILLIGRAM(S): at 05:32

## 2023-05-07 RX ADMIN — GABAPENTIN 100 MILLIGRAM(S): 400 CAPSULE ORAL at 21:36

## 2023-05-07 RX ADMIN — HEPARIN SODIUM 5000 UNIT(S): 5000 INJECTION INTRAVENOUS; SUBCUTANEOUS at 21:36

## 2023-05-07 RX ADMIN — DONEPEZIL HYDROCHLORIDE 10 MILLIGRAM(S): 10 TABLET, FILM COATED ORAL at 21:35

## 2023-05-07 RX ADMIN — HALOPERIDOL DECANOATE 1 MILLIGRAM(S): 100 INJECTION INTRAMUSCULAR at 15:54

## 2023-05-07 RX ADMIN — ATORVASTATIN CALCIUM 20 MILLIGRAM(S): 80 TABLET, FILM COATED ORAL at 21:35

## 2023-05-07 NOTE — PATIENT PROFILE ADULT - NSPROPTRIGHTSUPPORTPERSON_GEN_A_NUR
information could not be obtained Acitretin Pregnancy And Lactation Text: This medication is Pregnancy Category X and should not be given to women who are pregnant or may become pregnant in the future. This medication is excreted in breast milk.

## 2023-05-07 NOTE — H&P ADULT - NSHPPHYSICALEXAM_GEN_ALL_CORE
GENERAL: NAD, AAOx1  HEENT:  Atraumatic, Normocephalic.  conjunctiva and sclera clear, No JVD  PULMONARY: Clear to auscultation bilaterally; No wheeze  CARDIOVASCULAR: Regular rate and rhythm; No murmurs, rubs, or gallops  GASTROINTESTINAL: Soft, Nontender, Nondistended; Bowel sounds present  MUSCULOSKELETAL:  2+ Peripheral Pulses, No clubbing, cyanosis, or edema  NEUROLOGY: non-focal. NO spinal tenderness   SKIN: No rashes or lesions

## 2023-05-07 NOTE — ED PROVIDER NOTE - CLINICAL SUMMARY MEDICAL DECISION MAKING FREE TEXT BOX
Patient presented with chronic lower back pain as well as acute agitation and worsening dementia from assisted living facility.  History limited as patient not AAO and not providing further history.  On arrival patient otherwise afebrile, hemodynamically stable, but acutely agitated, confused, does not know where she is.  AAO x0.  Obtained labs which were grossly unremarkable including no significant leukocytosis, anemia, signs of dehydration/CLIFF, transaminitis or significant electrolyte abnormalities.  UA negative for infection.  EKG nonspecific, but no evidence of STEMI, and troponin negative.  CT of the abdomen pelvis negative for emergent intra-abdominal pathologies and negative for signs of injury to the spine.  Patient required several doses of sedation for acute agitation and after lengthy discussion with the son, he does not feel the patient is safe for assisted living and will require a higher level of supervision.  Patient is therefore no safe discharge and will require admission for placement.  Hemodynamically stable at time of admission.

## 2023-05-07 NOTE — ED ADULT NURSE NOTE - PATIENT IS UNABLE TO BE SCREENED DUE TO:
Biopsies from colonoscopy look good and did not show any high risk features.  Can repeat colonoscopy up to 10 years.  Please update [employer] maintenance confused/Other:

## 2023-05-07 NOTE — ED PROVIDER NOTE - CONSIDERATION OF ADMISSION OBSERVATION
Patient persistently agitated, living in assisted living where son does not feel patient is safe given her worsening dementia and needs higher level of supervision. Consideration of Admission/Observation

## 2023-05-07 NOTE — ED PROVIDER NOTE - PHYSICAL EXAMINATION
CONSTITUTIONAL: Agitated and combative  HEAD: Normocephalic; atraumatic.   RESPIRATORY: No signs of respiratory distress. Lung sounds are clear in all lobes bilaterally without rales, rhonchi, or wheezes.  CARDIOVASCULAR: irregularly irregular.   GI: tender to palpation in general abd area. Abdomen is soft, and without distention. Bowel sounds are present and normoactive in all four quadrants. No masses are noted.   BACK: No evidence of trauma or deformity. No midline tenderness. No CVA tenderness bilaterally. Normal ROM.   MS: Normal appearance and ROM in all four extremities. No tenderness to palpation and distal pulses are normal. Sensation to the upper and lower extremities is normal bilaterally.   NEURO: A & O x 0

## 2023-05-07 NOTE — H&P ADULT - ASSESSMENT
77-year-old female with a past medical history of hypertension, depression, and dementia who was brought in by EMS from assisted living for worsening of chronic back pain.      #Chronic back pain   #Severe scoliosis and degenerative changes of the spine   - Vitals stable  - No trauma or fall, no fractures  - CT A/P showing severe generative changes and severe scoliosis   Plan:  - PT  - Outpatient Ortho  - Pain management for possible steroid injections         #HTN      Alzheimer's Dementia  #Depression      #Misc  - GI ppx:  - DVT ppx:  - Diet:  - Code status  - Dispo:            77-year-old female with a past medical history of HTN, HLD, CKD stage 3, DM, depression, and dementia, chronic back pain who was brought in by EMS from [facility] for worsening back pain.     #Chronic back pain   #Severe scoliosis and degenerative changes of the spine   - Vitals stable  - No trauma or fall, no fractures  - CT A/P showing severe generative changes and severe scoliosis   Plan:  - PT  - Outpatient Ortho  - Pain management for possible steroid injections     #AMS 2/2 to sedation   - NPO for now until she is more alert and can complete bedside swallow eval  - Avoid ativan     #HTN  - c/w       #HLD  - c/w lipitor     #Dementia  #Depression      #Misc  - GI ppx: PPI  - DVT ppx: heparin subq  - Diet: NPo until more alert  - Code status  - Dispo: admit to medicine, anticipate for tomorrow            77-year-old female with a past medical history of HTN, HLD, CKD stage 3, DM, depression, and dementia, OA, osteoporosis, chronic back pain who was brought in by EMS from [facility] for worsening back pain.     #Chronic back pain   #Severe scoliosis and degenerative changes of the spine   #Osteoporosis   - Vitals stable  - No trauma or fall, no fractures  - PE benign   - CT A/P showing severe generative changes and severe scoliosis   - Not on any standing pain medications at the facility   Plan:  - PT  - Outpatient Ortho  - start  with standing tylenol and gabapentin 100mg TID, can add additional medications based on pain and when patient more alert   - If pain still uncontrolled, can consult pain management for steroid injections   - c/w alendronate and vit d supplementation       #AMS 2/2 to sedation   - NPO for now until she is more alert and can complete bedside swallow eval  - Avoid ativan   - AOx1 at baseline     #HTN  #CAD?   - c/w home losartan 50mg and norvasc  - c/w aspirin     #HLD  - c/w lipitor     #Dementia  #Depression  - c/w home aricept       #Misc  - GI ppx: PPI  - DVT ppx: heparin subq  - Diet: NPo until more alert  - Code status: DNR DNI, confirmed with son at bedside, MOLST filled out   - Dispo: admit to medicine, anticipate for tomorrow            77-year-old female with a past medical history of HTN, HLD, CKD stage 3, DM, depression, and dementia, OA, osteoporosis, chronic back pain who was brought in by EMS from [facility] for worsening back pain.     #Chronic back pain   #Severe scoliosis and degenerative changes of the spine   #Osteoporosis   - Vitals stable  - No trauma or fall, no fractures  - PE benign   - CT A/P showing severe generative changes and severe scoliosis   - Not on any standing pain medications at the facility   Plan:  - PT  - Outpatient Ortho  - start  with standing tylenol and gabapentin 100mg TID, can add additional medications based on pain and when patient more alert   - If pain still uncontrolled, can consult pain management for steroid injections   - c/w alendronate and vit d supplementation       #AMS 2/2 to sedation   - NPO for now until she is more alert and can complete bedside swallow eval  - Avoid ativan   - AOx1 at baseline     #HTN  #CAD?   - c/w home losartan 50mg and norvasc  - c/w aspirin     #HLD  - c/w lipitor     #DM  - f/u a1c  - hold home metformin  - Monitor FS     #Dementia  #Depression  - c/w home aricept       #Misc  - GI ppx: PPI  - DVT ppx: heparin subq  - Diet: NPo until more alert  - Code status: DNR DNI, confirmed with son at bedside, MOLST filled out   - Dispo: admit to medicine, anticipate for tomorrow

## 2023-05-07 NOTE — H&P ADULT - HISTORY OF PRESENT ILLNESS
77-year-old female with a past medical history of hypertension, depression, and dementia who was brought in by EMS from assisted living for back pain.  Patient is very anxious and unable to provide any information.  Spoke with son and was told that patient has baseline dementia and chronic back pain, but denies recent fall or injury.      In the ED: Afebrile, HR 63, /50, 99% RA. CBC with hgb 11.3 at baseline. CMP wnl, trop x1 neg, lipase neg. CT A/P performed showing significant scoliosis and degenerative changes.   - EKG NSR. Patient also had NM stress test on 3/2023 which was normal.   Of note patient was very anxious and agitated in ED prior to admission and received 2mg IM ativan.     Admit to medicine for PT and pain management.  77-year-old female with a past medical history of HTN, HLD, CKD stage 3,  DM, depression, and dementia, chronic back pain who was brought in by EMS from [facility] for worsening back pain. Patient is Aox__ at her baseline but currently is sedated after receiving ativan in the ED for agitation/ anxiety. She awakes to pain/ sternal rub, is HDS and is able to say her name but does not stay awake long enough to answer further questions. Collateral information obtained from Son Eb Reagan at bedside ([phone]). He denies recent injury or fall. Denies recent fevers, chills, weakness, weight loss, denies urinary or fecal incontinence. Reports that she mostly complains of low back pain. Ambulates generally with ____.     Of note was seen in the ED on 5/4 for UTI. On vantin BID to complete course on 5/10. Culture grew normal jarrell.     In the ED: Afebrile, HR 63, /50, 99% RA. CBC with hgb 11.3 at baseline. CMP wnl, trop x1 neg, lipase neg. CT A/P performed showing significant scoliosis and degenerative changes.   - EKG NSR. Patient also had NM stress test on 3/2023 which was normal.     Admit to medicine for PT and pain management.  77-year-old female with a past medical history of HTN, HLD, CKD stage 3,  DM, depression, and dementia, OA, osteoporosis, chronic back pain who was brought in by EMS from [facility] for worsening back pain. Patient is Aox1 at her baseline but currently is sedated after receiving ativan in the ED for agitation/ anxiety. She awakes to pain/ sternal rub, is HDS and is able to say her name but does not stay awake long enough to answer further questions. Collateral information obtained from Son Eb Reagan at bedside ([phone]). He denies recent injury or fall. Denies recent fevers, chills, weakness, weight loss, denies urinary or fecal incontinence. Reports that she mostly complains of low back pain and occasional abdominal pain which has been ongoing for years.     Of note was seen in the ED on 5/4 for UTI. On vantin BID to complete course on 5/10. Culture grew normal jarrell.     In the ED: Afebrile, HR 63, /50, 99% RA. CBC with hgb 11.3 at baseline. CMP wnl, trop x1 neg, lipase neg. CT A/P performed showing significant scoliosis and degenerative changes.   - EKG NSR. Patient also had NM stress test on 3/2023 which was normal.     Admit to medicine for PT and pain management.  77-year-old female with a past medical history of HTN, HLD, CKD stage 3,  DM, depression, and dementia, OA, osteoporosis, chronic back pain who was brought in by EMS from [facility] for worsening back pain. Patient is Aox1 at her baseline but currently is sedated after receiving ativan in the ED for agitation/ anxiety. She awakes to pain/ sternal rub, is HDS and is able to say her name but does not stay awake long enough to answer further questions. Collateral information obtained from Son Eb Reagan at bedside ([phone]). He denies recent injury or fall. Denies recent fevers, chills, weakness, weight loss, denies urinary or fecal incontinence. Reports that she mostly complains of low back pain and occasional abdominal pain which has been ongoing for years.     Of note was seen in the ED on 5/4 for UTI. On vantin BID to complete course on 5/10. Culture grew normal jarrell.     In the ED: Afebrile, HR 63, /50, 99% RA. CBC with hgb 11.3 at baseline. CMP wnl, trop x1 neg, lipase neg. CT A/P performed showing significant scoliosis and degenerative changes.   - EKG NSR. Patient also had NM stress test on 3/2023 which was normal.     Admit to medicine for PT and pain management.

## 2023-05-07 NOTE — ED PROVIDER NOTE - OBJECTIVE STATEMENT
77-year-old female with a past medical history of hypertension, depression, and dementia who was brought in by EMS from assisted living for back pain.  Patient is very anxious and unable to provide any information.  Spoke with son and was told that patient has baseline dementia and chronic back pain, but denies recent fall or injury.  Per son, patient also had multiple episodes of UTI.  Rest of HPI limited.

## 2023-05-07 NOTE — PATIENT PROFILE ADULT - FALL HARM RISK - HARM RISK INTERVENTIONS

## 2023-05-07 NOTE — ED PROVIDER NOTE - DIFFERENTIAL DIAGNOSIS
Agitation, back pain r/o infection/UTI, spinal injury, electrolyte abnormality, dehydration, anemia Differential Diagnosis

## 2023-05-07 NOTE — ED PROVIDER NOTE - NSICDXPASTMEDICALHX_GEN_ALL_CORE_FT
PAST MEDICAL HISTORY:  Dementia     Diabetes mellitus     H/O: depression     Hypertension     Stage 3 chronic kidney disease

## 2023-05-07 NOTE — H&P ADULT - ATTENDING COMMENTS
77-year-old female with a past medical history of HTN, HLD, CKD stage 3, DM, depression, and dementia, OA, osteoporosis, chronic back pain who was brought in by EMS from [facility] for worsening back pain.     #Acute on Chronic back pain   #Osteoporosis   - Vitals stable  - No trauma or fall, no fractures  - PE benign   - CT A/P showing severe degenerative changes and severe scoliosis   - Not on any standing pain medications at the facility   Plan:  - Outpatient Ortho  - start  with standing tylenol and gabapentin 100mg TID, can add additional medications based on pain and when patient more alert   - If pain still uncontrolled, can consult pain management for steroid injections   - c/w alendronate and vit d supplementation   PT eval      #AMS 2/2 to sedation   - NPO for now until she is more alert and can complete bedside swallow eval  - Avoid ativan   - AOx1 at baseline     #HTN  #CAD?   - c/w home losartan 50mg and norvasc  - c/w aspirin     #HLD  - c/w lipitor     #DM  - f/u a1c  - hold home metformin  - Monitor FS     #Dementia  #Depression  - c/w home aricept       #Misc  - GI ppx: PPI  - DVT ppx: heparin subq  - Diet: NPo until more alert  - Code status: DNR DNI, confirmed with son at bedside, MOLST filled out   - Dispo: admit to medicine, anticipate for tomorrow 77-year-old female with a past medical history of HTN, HLD, CKD stage 3, DM, depression, and dementia, OA, osteoporosis, chronic back pain who was brought in by EMS from [facility] for worsening back pain.     #Acute on Chronic back pain   #Osteoporosis   - Vitals stable  - No trauma or fall, no fractures  - PE benign   - CT A/P showing severe degenerative changes and severe scoliosis   - Not on any standing pain medications at the facility   Plan:  - Outpatient Ortho  - start  with standing tylenol and gabapentin 100mg TID, can add additional medications based on pain and when patient more alert   - If pain still uncontrolled, can consult pain management for steroid injections   - c/w alendronate and vit d supplementation   PT eval  Patient was drowsy from the ativan in ER. On my Re-exam around 8PM, patient is alert, awake and agitated. Trying to get out of bed. informed RN to inform housestaff if patient remains agitated after talking her down, for PRN haldol.       #HTN  #CAD?   - c/w home losartan 50mg and norvasc  - c/w aspirin     #HLD  - c/w lipitor     #DM  - f/u a1c  - hold home metformin  - Monitor FS     #Dementia  #Depression  - c/w home aricept       #Misc  - GI ppx: PPI  - DVT ppx: heparin subq  - Diet: NPo until more alert  - Code status: DNR DNI, confirmed with son at bedside, MOLST filled out   - Dispo: admit to medicine, anticipate for 24-48 hours. f/u PT

## 2023-05-08 LAB
A1C WITH ESTIMATED AVERAGE GLUCOSE RESULT: 5.8 % — HIGH (ref 4–5.6)
ALBUMIN SERPL ELPH-MCNC: 3.7 G/DL — SIGNIFICANT CHANGE UP (ref 3.5–5.2)
ALP SERPL-CCNC: 74 U/L — SIGNIFICANT CHANGE UP (ref 30–115)
ALT FLD-CCNC: 11 U/L — SIGNIFICANT CHANGE UP (ref 0–41)
ANION GAP SERPL CALC-SCNC: 13 MMOL/L — SIGNIFICANT CHANGE UP (ref 7–14)
AST SERPL-CCNC: 20 U/L — SIGNIFICANT CHANGE UP (ref 0–41)
BASOPHILS # BLD AUTO: 0.07 K/UL — SIGNIFICANT CHANGE UP (ref 0–0.2)
BASOPHILS NFR BLD AUTO: 1.1 % — HIGH (ref 0–1)
BILIRUB SERPL-MCNC: 0.3 MG/DL — SIGNIFICANT CHANGE UP (ref 0.2–1.2)
BUN SERPL-MCNC: 19 MG/DL — SIGNIFICANT CHANGE UP (ref 10–20)
CALCIUM SERPL-MCNC: 9.7 MG/DL — SIGNIFICANT CHANGE UP (ref 8.4–10.5)
CHLORIDE SERPL-SCNC: 107 MMOL/L — SIGNIFICANT CHANGE UP (ref 98–110)
CHOLEST SERPL-MCNC: 159 MG/DL — SIGNIFICANT CHANGE UP
CO2 SERPL-SCNC: 22 MMOL/L — SIGNIFICANT CHANGE UP (ref 17–32)
CREAT SERPL-MCNC: 0.9 MG/DL — SIGNIFICANT CHANGE UP (ref 0.7–1.5)
CULTURE RESULTS: NO GROWTH — SIGNIFICANT CHANGE UP
EGFR: 67 ML/MIN/1.73M2 — SIGNIFICANT CHANGE UP
EOSINOPHIL # BLD AUTO: 0.26 K/UL — SIGNIFICANT CHANGE UP (ref 0–0.7)
EOSINOPHIL NFR BLD AUTO: 4.2 % — SIGNIFICANT CHANGE UP (ref 0–8)
ESTIMATED AVERAGE GLUCOSE: 120 MG/DL — HIGH (ref 68–114)
FERRITIN SERPL-MCNC: 109 NG/ML — SIGNIFICANT CHANGE UP (ref 15–150)
GLUCOSE SERPL-MCNC: 89 MG/DL — SIGNIFICANT CHANGE UP (ref 70–99)
HCT VFR BLD CALC: 38.7 % — SIGNIFICANT CHANGE UP (ref 37–47)
HCV AB S/CO SERPL IA: 0.03 COI — SIGNIFICANT CHANGE UP
HCV AB SERPL-IMP: SIGNIFICANT CHANGE UP
HDLC SERPL-MCNC: 54 MG/DL — SIGNIFICANT CHANGE UP
HGB BLD-MCNC: 12.7 G/DL — SIGNIFICANT CHANGE UP (ref 12–16)
IMM GRANULOCYTES NFR BLD AUTO: 0.2 % — SIGNIFICANT CHANGE UP (ref 0.1–0.3)
IRON SATN MFR SERPL: 27 % — SIGNIFICANT CHANGE UP (ref 15–50)
IRON SATN MFR SERPL: 57 UG/DL — SIGNIFICANT CHANGE UP (ref 35–150)
LIPID PNL WITH DIRECT LDL SERPL: 82 MG/DL — SIGNIFICANT CHANGE UP
LYMPHOCYTES # BLD AUTO: 1.95 K/UL — SIGNIFICANT CHANGE UP (ref 1.2–3.4)
LYMPHOCYTES # BLD AUTO: 31.4 % — SIGNIFICANT CHANGE UP (ref 20.5–51.1)
MAGNESIUM SERPL-MCNC: 2.1 MG/DL — SIGNIFICANT CHANGE UP (ref 1.8–2.4)
MCHC RBC-ENTMCNC: 31.8 PG — HIGH (ref 27–31)
MCHC RBC-ENTMCNC: 32.8 G/DL — SIGNIFICANT CHANGE UP (ref 32–37)
MCV RBC AUTO: 97 FL — SIGNIFICANT CHANGE UP (ref 81–99)
MONOCYTES # BLD AUTO: 0.51 K/UL — SIGNIFICANT CHANGE UP (ref 0.1–0.6)
MONOCYTES NFR BLD AUTO: 8.2 % — SIGNIFICANT CHANGE UP (ref 1.7–9.3)
NEUTROPHILS # BLD AUTO: 3.41 K/UL — SIGNIFICANT CHANGE UP (ref 1.4–6.5)
NEUTROPHILS NFR BLD AUTO: 54.9 % — SIGNIFICANT CHANGE UP (ref 42.2–75.2)
NON HDL CHOLESTEROL: 105 MG/DL — SIGNIFICANT CHANGE UP
NRBC # BLD: 0 /100 WBCS — SIGNIFICANT CHANGE UP (ref 0–0)
PLATELET # BLD AUTO: 251 K/UL — SIGNIFICANT CHANGE UP (ref 130–400)
PMV BLD: 10.7 FL — HIGH (ref 7.4–10.4)
POTASSIUM SERPL-MCNC: 4.4 MMOL/L — SIGNIFICANT CHANGE UP (ref 3.5–5)
POTASSIUM SERPL-SCNC: 4.4 MMOL/L — SIGNIFICANT CHANGE UP (ref 3.5–5)
PROT SERPL-MCNC: 5.8 G/DL — LOW (ref 6–8)
RBC # BLD: 3.99 M/UL — LOW (ref 4.2–5.4)
RBC # BLD: 3.99 M/UL — LOW (ref 4.2–5.4)
RBC # FLD: 12.7 % — SIGNIFICANT CHANGE UP (ref 11.5–14.5)
RETICS #: 45.9 K/UL — SIGNIFICANT CHANGE UP (ref 25–125)
RETICS/RBC NFR: 1.2 % — SIGNIFICANT CHANGE UP (ref 0.5–1.5)
SARS-COV-2 RNA SPEC QL NAA+PROBE: SIGNIFICANT CHANGE UP
SODIUM SERPL-SCNC: 142 MMOL/L — SIGNIFICANT CHANGE UP (ref 135–146)
SPECIMEN SOURCE: SIGNIFICANT CHANGE UP
TIBC SERPL-MCNC: 209 UG/DL — LOW (ref 220–430)
TRIGL SERPL-MCNC: 116 MG/DL — SIGNIFICANT CHANGE UP
UIBC SERPL-MCNC: 152 UG/DL — SIGNIFICANT CHANGE UP (ref 110–370)
WBC # BLD: 6.21 K/UL — SIGNIFICANT CHANGE UP (ref 4.8–10.8)
WBC # FLD AUTO: 6.21 K/UL — SIGNIFICANT CHANGE UP (ref 4.8–10.8)

## 2023-05-08 PROCEDURE — 93010 ELECTROCARDIOGRAM REPORT: CPT | Mod: 77

## 2023-05-08 PROCEDURE — 99232 SBSQ HOSP IP/OBS MODERATE 35: CPT

## 2023-05-08 PROCEDURE — 93010 ELECTROCARDIOGRAM REPORT: CPT

## 2023-05-08 RX ORDER — GABAPENTIN 400 MG/1
1 CAPSULE ORAL
Qty: 0 | Refills: 0 | DISCHARGE
Start: 2023-05-08

## 2023-05-08 RX ORDER — ACETAMINOPHEN 500 MG
2 TABLET ORAL
Qty: 0 | Refills: 0 | DISCHARGE
Start: 2023-05-08

## 2023-05-08 RX ORDER — OLANZAPINE 15 MG/1
2.5 TABLET, FILM COATED ORAL ONCE
Refills: 0 | Status: COMPLETED | OUTPATIENT
Start: 2023-05-08 | End: 2023-05-08

## 2023-05-08 RX ORDER — GABAPENTIN 400 MG/1
1 CAPSULE ORAL
Qty: 90 | Refills: 3
Start: 2023-05-08 | End: 2023-09-04

## 2023-05-08 RX ORDER — ACETAMINOPHEN 500 MG
2 TABLET ORAL
Qty: 240 | Refills: 3
Start: 2023-05-08 | End: 2023-09-04

## 2023-05-08 RX ADMIN — Medication 650 MILLIGRAM(S): at 11:19

## 2023-05-08 RX ADMIN — HEPARIN SODIUM 5000 UNIT(S): 5000 INJECTION INTRAVENOUS; SUBCUTANEOUS at 14:33

## 2023-05-08 RX ADMIN — GABAPENTIN 100 MILLIGRAM(S): 400 CAPSULE ORAL at 14:33

## 2023-05-08 RX ADMIN — OLANZAPINE 2.5 MILLIGRAM(S): 15 TABLET, FILM COATED ORAL at 23:04

## 2023-05-08 RX ADMIN — AMLODIPINE BESYLATE 10 MILLIGRAM(S): 2.5 TABLET ORAL at 06:43

## 2023-05-08 RX ADMIN — Medication 650 MILLIGRAM(S): at 06:42

## 2023-05-08 RX ADMIN — PREGABALIN 1000 MICROGRAM(S): 225 CAPSULE ORAL at 11:19

## 2023-05-08 RX ADMIN — ATORVASTATIN CALCIUM 20 MILLIGRAM(S): 80 TABLET, FILM COATED ORAL at 21:27

## 2023-05-08 RX ADMIN — GABAPENTIN 100 MILLIGRAM(S): 400 CAPSULE ORAL at 06:42

## 2023-05-08 RX ADMIN — Medication 650 MILLIGRAM(S): at 17:11

## 2023-05-08 RX ADMIN — Medication 650 MILLIGRAM(S): at 17:14

## 2023-05-08 RX ADMIN — Medication 650 MILLIGRAM(S): at 07:51

## 2023-05-08 RX ADMIN — GABAPENTIN 100 MILLIGRAM(S): 400 CAPSULE ORAL at 21:27

## 2023-05-08 RX ADMIN — Medication 81 MILLIGRAM(S): at 11:19

## 2023-05-08 RX ADMIN — HEPARIN SODIUM 5000 UNIT(S): 5000 INJECTION INTRAVENOUS; SUBCUTANEOUS at 21:27

## 2023-05-08 RX ADMIN — POLYETHYLENE GLYCOL 3350 17 GRAM(S): 17 POWDER, FOR SOLUTION ORAL at 11:19

## 2023-05-08 RX ADMIN — SERTRALINE 50 MILLIGRAM(S): 25 TABLET, FILM COATED ORAL at 11:19

## 2023-05-08 RX ADMIN — Medication 650 MILLIGRAM(S): at 12:01

## 2023-05-08 RX ADMIN — HEPARIN SODIUM 5000 UNIT(S): 5000 INJECTION INTRAVENOUS; SUBCUTANEOUS at 06:43

## 2023-05-08 RX ADMIN — PANTOPRAZOLE SODIUM 40 MILLIGRAM(S): 20 TABLET, DELAYED RELEASE ORAL at 06:42

## 2023-05-08 RX ADMIN — DONEPEZIL HYDROCHLORIDE 10 MILLIGRAM(S): 10 TABLET, FILM COATED ORAL at 21:27

## 2023-05-08 RX ADMIN — CHLORHEXIDINE GLUCONATE 1 APPLICATION(S): 213 SOLUTION TOPICAL at 06:43

## 2023-05-08 NOTE — PHYSICAL THERAPY INITIAL EVALUATION ADULT - PERTINENT HX OF CURRENT PROBLEM, REHAB EVAL
77-year-old female with a past medical history of HTN, HLD, CKD stage 3,  DM, depression, and dementia, OA, osteoporosis, chronic back pain who was brought in by EMS from [facility] for worsening back pain. Patient is Aox1 at her baseline but currently is sedated after receiving ativan in the ED for agitation/ anxiety. She awakes to pain/ sternal rub, is HDS and is able to say her name but does not stay awake long enough to answer further questions. Collateral information obtained from Son Eb Reagan at bedside ([phone]). He denies recent injury or fall. Denies recent fevers, chills, weakness, weight loss, denies urinary or fecal incontinence. Reports that she mostly complains of low back pain and occasional abdominal pain which has been ongoing for years.

## 2023-05-08 NOTE — SWALLOW BEDSIDE ASSESSMENT ADULT - ADDITIONAL RECOMMENDATIONS
Pt benefits from tray set up and assistance w/ PO intake 2/2 confusion.  d/c from [facility], reconsult PRN.

## 2023-05-08 NOTE — PHYSICAL THERAPY INITIAL EVALUATION ADULT - NSPTDISCHREC_GEN_A_CORE
Rehabilitation Facility vs Home PT depending on availability of supervison at home and family wishes. Rehabilitation Facility

## 2023-05-08 NOTE — SWALLOW BEDSIDE ASSESSMENT ADULT - SLP PERTINENT HISTORY OF CURRENT PROBLEM
77-year-old female with a past medical history of HTN, HLD, CKD stage 3, DM, depression, and dementia (A&Ox1 at baseline), OA, osteoporosis, chronic back pain who was brought in by EMS from [facility] for worsening back pain. Received ativan in ED for anxiety and become lethargic- made NPO pending speech/swallow

## 2023-05-08 NOTE — PHYSICAL THERAPY INITIAL EVALUATION ADULT - ADDITIONAL COMMENTS
History obtained from son Eb on phone number listed in contact information. Pt usually does not ambulate with RW, is a long term resident of [facility]. Pt receives supervision with dressing and showering for redirection and task focus.

## 2023-05-08 NOTE — DISCHARGE NOTE PROVIDER - NSDCCPCAREPLAN_GEN_ALL_CORE_FT
PRINCIPAL DISCHARGE DIAGNOSIS  Diagnosis: Back pain  Assessment and Plan of Treatment: You were admitted with back pain. Imaging showed scoliosis and degenerative changes. You were started on pain medications tylenol and gabapentin      SECONDARY DISCHARGE DIAGNOSES  Diagnosis: Agitated  Assessment and Plan of Treatment:     Diagnosis: Dementia  Assessment and Plan of Treatment:      PRINCIPAL DISCHARGE DIAGNOSIS  Diagnosis: Back pain  Assessment and Plan of Treatment: You were admitted with back pain. Imaging showed scoliosis and degenerative changes. You were started on pain medications tylenol and gabapentin

## 2023-05-08 NOTE — PHYSICAL THERAPY INITIAL EVALUATION ADULT - LEVEL OF INDEPENDENCE: STAND/SIT, REHAB EVAL
CARDIOLOGY CLINIC NOTE   DATE:  1/15/2019    PCP: Clemencia Gallegos MD    HISTORY:  Blanca Oliver is a 71 year old female with history of CAD and previous bypass surgery, hypertension, and hyperlipidemia     ASSESSMENT/PLAN:   1. Three vessel coronary artery disease.   - S/p 4-V CABG on 06/18/2004 with Dr. Condon.   - Abnormal resting EKG with mildly ischemic-appearing inferolateral ST segment changes, however, no anginal symptoms and no nuclear imaging evidence for ischemia (or infarct).   - Nuclear Stress Test 12/01/2016: No chest pain. Abnormal resting EKG and exercise EKG. No infarct or ischemia by nuclear imaging.  - Echocardiogram 12/01/2016: Normal systolic function with no focal wall motion abnormalities.    - Clinically stable without anginal symptoms.  - Continue current medications, good diet, and exercise.  - Follow-up Nuclear stress testing and Echocardiogram in 7 months.  2. Hyperlipidemia.   - Good control with LDL of 70's-80's..   - Continue with good diet and exercise.   - LDL, SGPT in July.  3. Hypertension   - Good control.   4. Mild valvular heart disease  - Echo 12/01/2016:  Moderate aortic valve sclerosis without stenosis with decreased but adequate cusp separation.  No aortic valve regurgitation.  Mild mitral valve sclerosis without stenosis.  Mild MV, TV and PV regurgitation.  Normal estimated pulmonary artery systolic pressure 26 mmHg  - Clinically stable    - Follow up Echo in 7 months.  5. LV diastolic dysfunction  - Echo 12/1/2016:  Evidence for Grade II/IV diastolic dysfunction, moderately elevated filling pressures.  - Clinically stable  - Follow up Echo in 7 months.    RECOMMEND:  Continue with current medications   Continue with exercise and good diet   Lipids/SGPT 2/19-3/19/19  Nuclear Stress test and Echo in 7 months, sooner if symptomatic. *** 1/10/19  RTC *** months   ____________________________________________________________________  SUBJECTIVE:  Blanca Oliver is a 71  year old female here today for a 8 month follow up.  Patient states she feels \"***\".      Minimal swelling to BLE by end of day, unchanged from previous visit.   Patient denies chest pain, SOB/PLASCENCIA, PND/orthopnea, palpitations, dizziness, syncope, claudication,  fatigue.      Hospitalizations since last visit: none     Exercise: 5 days a week she walks, bikes, weights and stretches.     Sleep: okay  Appetite: good.     PHYSICAL EXAM:  Last menstrual period 03/01/2002.  Neck: Supple, No JVD. Carotid pulses 2+  Lungs: Resp effort good, Clear throughout  Heart: RRR, normal S1S2 without S3, w/gr I/VI DIDIER R/LUSB, LSB  Abd: Pos Bowel sounds, soft, nontender  Ext: No Edema BLE, 2+ PT/DP pulses      DIAGNOSTIC LABS/DATA:    NM STRESS TEST 1/10/19:  STRESS:  CONCLUSIONS:  1. Maximum Exercise Stress Test performed at peak heart rate of 162 bpm, which was 107 % max predicted, reaching a cardiac workload of 8.1 METs.  2. Last beta blocker and other cardiac meds yesterday morning.  3. No chest pain. No significant dyspnea with exercise.  4. Abnormal resting EKG with inferior and anterolateral ST depression. Additional nondiagnostic upsloping inferior and anterolateral ST depression with exercise.  5. Rare PVCs.  NUCLEAR:  Impression:  1.  Normal myocardial perfusion scans during exercise and at rest.  2.  Normal post-exercise left ventricular systolic function.  3. Cardiac Risk Assessment: Low.       ECHO 1/10/19:  Normal left ventricular cavity size. Mildly increased left ventricular wall thickness.  Normal left ventricular systolic function. No regional wall motion abnormalities.  Left ventricular ejection fraction, 70 %. Global longitudinal strain -18 %.  Grade I/IV diastolic dysfunction.  Mildly increased left atrial size.  Normal size right atrium and right ventricle, with normal RV systolic function.  Moderate aortic valve sclerosis without stenosis or regurgitation, with decreased but adequate cusp seperation.  No aortic  valve regurgitation.  Moderate mitral annular calcification. Mild mitral valve sclerosis without stenosis.  Trace MV regurgitation. Mild TV and PV regurgitation.  Normal estimated pulmonary pressure, 27 mmHg.  Compared with 12/01/2016:  Grade I rather than grade II left ventricular diastolic dysfunction now present.    CHEST CT 1/22/18:  IMPRESSION:  Lung nodules: Three solid nodules, the largest of which measures 3 mm. Two  nodules remain unchanged since 11/15/2016. A new 2 mm nodule is seen within  the anterior right lower lobe.  Lung-RADS category: 2 - Benign.  Recommendations: Continue annual screening low-dose CT in 12 months.  Incidental findings on screening CT:   1.  Mild upper lung predominant centrilobular emphysema.  2.  Biapical pleural scarring.      LABS:  Recent Labs   Lab 11/20/18  1018 07/18/18  0733   HGB  --  13.6   BUN 18  --    Creatinine 0.89  --    Hemoglobin A1C 6.1* 6.0*   Potassium 3.7  --    ALT/SGPT  --  24   LDL (Direct)  --  90       ALLERGIES:  Pneumococcal polysaccharides conjugated vaccine and Sulfa antibiotics    Outpatient Encounter Medications as of 1/22/2019   Medication Sig Dispense Refill   • ramipril (ALTACE) 5 MG capsule Take 1 capsule by mouth daily. 90 capsule 1   • ezetimibe (ZETIA) 10 MG tablet Take 1 tablet by mouth daily. 90 tablet 1   • ALPRAZolam (XANAX) 0.5 MG tablet One tablet 30 minutes before anxiety producing event 30 tablet 0   • hydrochlorothiazide (HYDRODIURIL) 25 MG tablet Take 0.5 tablets by mouth daily. 45 tablet 1   • atorvastatin (LIPITOR) 80 MG tablet Take 1 tablet by mouth daily. 90 tablet 1   • metoPROLOL tartrate (LOPRESSOR) 25 MG tablet Take 0.5 mg tablet  twice a day. 90 tablet 1   • scopolamine (TRANSDERM_SCOP) 1 MG/3DAYS patch Place 1 patch onto the skin every 72 hours. 10 patch 0   • chlorpheniramine (CHLOR-TRIMETON) 4 MG tablet Take 4 mg by mouth every 6 hours as needed for Allergies.      • Psyllium (METAMUCIL PO) Take  by mouth daily.     •  HYDROXYPROPYL METHYLCELLULOSE (GENTEAL) 0.3 % SOLN Place 1 drop into both eyes daily as needed (dry eyes).      • Flaxseed, Linseed, (FLAX SEED OIL) 1000 MG capsule Take 1,000 mg by mouth daily.       • Cholecalciferol (VITAMIN D) 1000 UNIT capsule Take 1,000 Units by mouth daily.     • ASPIRIN 81 MG PO TBEC 1 TABLET DAILY 0 0     No facility-administered encounter medications on file as of 1/22/2019.             minimum assist (75% patients effort)

## 2023-05-08 NOTE — DISCHARGE NOTE PROVIDER - CARE PROVIDER_API CALL
EDUARDO WILLETT  Internal Medicine  [address] [place] [address]  Phone: ()-  Fax: ()-  Follow Up Time: 2 weeks   EDUARDO WILLETT  Internal Medicine  [id number] [place] [address]  Phone: ()-  Fax: ()-  Follow Up Time: 1 week

## 2023-05-08 NOTE — CHART NOTE - NSCHARTNOTEFT_GEN_A_CORE
Called by RN for patient agitation. Upon chart review, QTc >550. Will administer low dose zyprexa IM, obtain new EKG (most recent from yesterday).

## 2023-05-08 NOTE — DISCHARGE NOTE PROVIDER - NSDCMRMEDTOKEN_GEN_ALL_CORE_FT
acetaminophen 325 mg oral tablet: 2 tab(s) orally every 6 hours  alendronate 70 mg oral tablet: 1 tab(s) orally once a week  Aricept 10 mg oral tablet: 1 tab(s) orally once a day  aspirin 81 mg oral tablet, chewable: 1 tab(s) chewed once a day  calcium-vitamin D 250 mg-6.25 mcg (250 intl units) oral tablet, chewable: orally once a day  cyanocobalamin 1000 mcg oral tablet: 1 orally once a day  docusate sodium 100 mg oral tablet: 1 tab(s) orally once a day  gabapentin 100 mg oral capsule: 1 cap(s) orally 3 times a day  Lipitor 20 mg oral tablet: 1 tab(s) orally once a day  metFORMIN 500 mg oral tablet: 1 orally once a day  MiraLax oral powder for reconstitution: 17 orally once a day  miSOPROStol 100 mcg oral tablet: 1 orally once a day  Norvasc 10 mg oral tablet: 1 orally once a day  Protonix 20 mg oral delayed release tablet: 1 orally once a day  sertraline 50 mg oral tablet: 1 orally once a day   acetaminophen 325 mg oral tablet: 2 tab(s) orally every 6 hours as needed for  mild pain  alendronate 70 mg oral tablet: 1 tab(s) orally once a week  Aricept 10 mg oral tablet: 1 tab(s) orally once a day  aspirin 81 mg oral tablet, chewable: 1 tab(s) chewed once a day  calcium-vitamin D 250 mg-6.25 mcg (250 intl units) oral tablet, chewable: orally once a day  cyanocobalamin 1000 mcg oral tablet: 1 orally once a day  docusate sodium 100 mg oral tablet: 1 tab(s) orally once a day  gabapentin 100 mg oral capsule: 1 cap(s) orally 3 times a day  Lipitor 20 mg oral tablet: 1 tab(s) orally once a day  metFORMIN 500 mg oral tablet: 1 orally once a day  MiraLax oral powder for reconstitution: 17 orally once a day  miSOPROStol 100 mcg oral tablet: 1 orally once a day  Norvasc 10 mg oral tablet: 1 orally once a day  Protonix 20 mg oral delayed release tablet: 1 orally once a day  sertraline 50 mg oral tablet: 1 orally once a day

## 2023-05-08 NOTE — PROGRESS NOTE ADULT - ASSESSMENT
77-year-old female with a past medical history of HTN, HLD, CKD stage 3, DM, depression, and dementia, OA, osteoporosis, chronic back pain who was brought in by EMS from [facility] for worsening back pain.     #Chronic back pain   #Severe scoliosis and degenerative changes of the spine   #Osteoporosis   - No trauma or fall, no fractures  - CT A/P showing severe generative changes and severe scoliosis   - Not on any standing pain medications at the facility   - PT--> 5 feet with rolling walker   - Outpatient Ortho  - start  with standing tylenol and gabapentin 100mg TID, can add additional medications based on pain and when patient more alert   - c/w alendronate and vit d supplementation   - needs STR placement       #AMS 2/2 to sedation- improved   - Avoid ativan   - AOx1 at baseline   - S&S--> regular with thins     #HTN  #CAD?   - c/w home losartan 50mg and norvasc  - c/w aspirin     #HLD  - c/w lipitor     #DM  - f/u a1c  - hold home metformin  - Monitor FS     #Dementia  #Depression  - c/w home aricept       #Misc  - GI ppx: PPI  - DVT ppx: heparin subq  - Diet: DASH with thins   - Code status: DNR DNI, confirmed with son at bedside, MOLST filled out   - Dispo: STR

## 2023-05-08 NOTE — DISCHARGE NOTE PROVIDER - PROVIDER TOKENS
PROVIDER:[TOKEN:[[id number]],FOLLOWUP:[2 weeks]] PROVIDER:[TOKEN:[[id number]],FOLLOWUP:[1 week]]

## 2023-05-08 NOTE — DISCHARGE NOTE PROVIDER - HOSPITAL COURSE
77-year-old female with a past medical history of HTN, HLD, CKD stage 3,  DM, depression, and dementia, OA, osteoporosis, chronic back pain who was brought in by EMS from [facility] for worsening back pain. Patient is Aox1 at her baseline but currently is sedated after receiving ativan in the ED for agitation/ anxiety. She awakes to pain/ sternal rub, is HDS and is able to say her name but does not stay awake long enough to answer further questions. Collateral information obtained from Son Eb Reagan at bedside ([phone]). He denies recent injury or fall. Denies recent fevers, chills, weakness, weight loss, denies urinary or fecal incontinence. Reports that she mostly complains of low back pain and occasional abdominal pain which has been ongoing for years.   Of note was seen in the ED on 5/4 for UTI. On vantin BID to complete course on 5/10. Culture grew normal jarrell.   In the ED: Afebrile, HR 63, /50, 99% RA. CBC with hgb 11.3 at baseline. CMP wnl, trop x1 neg, lipase neg. CT A/P performed showing significant scoliosis and degenerative changes.   EKG NSR. Patient also had NM stress test on 3/2023 which was normal.   Patient admitted to medicine for PT and pain management.   She was started on tylenol ad gabapentin for pain.     Patient was seen and examined today at bedside. She is stable and ready for discharge. Medical attending:    Patient seen and examined this morning  confused  ly8ing in bed  denies any complaints     Vital Signs Last 24 Hrs  T(C): 35.6 (10 May 2023 07:52), Max: 36.4 (09 May 2023 15:38)  T(F): 96 (10 May 2023 07:52), Max: 97.5 (09 May 2023 15:38)  HR: 52 (10 May 2023 07:52) (52 - 73)  BP: 124/59 (10 May 2023 07:52) (110/62 - 132/64)  BP(mean): --  RR: 18 (10 May 2023 07:52) (18 - 18)  SpO2: 98% (10 May 2023 07:52) (98% - 98%)    Parameters below as of 10 May 2023 07:52  Patient On (Oxygen Delivery Method): room air    PHYSICAL EXAM:  GENERAL: NAD, well-groomed, well-developed  HEAD:  Atraumatic, Normocephalic  EYES: EOMI, PERRLA, conjunctiva and sclera clear  NERVOUS SYSTEM: awake, confused, moves all extremities   CHEST/LUNG: Clear to percussion bilaterally; No rales, rhonchi, wheezing, or rubs  HEART: Regular rate and rhythm; No murmurs, rubs, or gallops  ABDOMEN: Soft, Nontender, Nondistended; Bowel sounds present  EXTREMITIES:  2+ Peripheral Pulses, No clubbing, cyanosis, or edema  SKIN: No rashes or lesions    77-year-old female with a past medical history of HTN, HLD, CKD stage 3,  DM, depression, and dementia, OA, osteoporosis, chronic back pain who was brought in by EMS from [facility] for worsening back pain. Patient is Aox1 at her baseline but currently is sedated after receiving ativan in the ED for agitation/ anxiety. She awakes to pain/ sternal rub, is HDS and is able to say her name but does not stay awake long enough to answer further questions. Collateral information obtained from Son Eb Reagan at bedside ([phone]). He denies recent injury or fall. Denies recent fevers, chills, weakness, weight loss, denies urinary or fecal incontinence. Reports that she mostly complains of low back pain and occasional abdominal pain which has been ongoing for years.   Of note was seen in the ED on 5/4 for UTI. On vantin BID to complete course on 5/10. Culture grew normal jarrell.   In the ED: Afebrile, HR 63, /50, 99% RA. CBC with hgb 11.3 at baseline. CMP wnl, trop x1 neg, lipase neg. CT A/P performed showing significant scoliosis and degenerative changes.   EKG NSR. Patient also had NM stress test on 3/2023 which was normal.   Patient admitted to medicine for PT and pain management.   She was started on tylenol ad gabapentin for pain.     Patient was seen and examined today at bedside. She is stable and ready for discharge.   D/C today; d/c planning took over 75 min  d/c papers done by me  discussed d/c plan and all instructions in detail

## 2023-05-08 NOTE — PHYSICAL THERAPY INITIAL EVALUATION ADULT - NSACTIVITYREC_GEN_A_PT
Patient requires assistance with functional mobility. PT recommends D/C to home with Home PT vs rehabilitation facility depending on availability of supervision at home and familys wishes. PT recommends RW for D/C to home. Refer to evaluation for details. Patient requires assistance with functional mobility. PT recommends D/C to Rehabilitation Facility when medically appropriate. PT recommends RW for D/C to home. Refer to evaluation for details.

## 2023-05-08 NOTE — PHYSICAL THERAPY INITIAL EVALUATION ADULT - GENERAL OBSERVATIONS, REHAB EVAL
10:45-11:25 Pt encountered semi perez in bed in NAD with + rock and roll, +call bell, +bed rails, +bed alarm

## 2023-05-09 PROCEDURE — 99231 SBSQ HOSP IP/OBS SF/LOW 25: CPT

## 2023-05-09 RX ADMIN — Medication 650 MILLIGRAM(S): at 05:44

## 2023-05-09 RX ADMIN — DONEPEZIL HYDROCHLORIDE 10 MILLIGRAM(S): 10 TABLET, FILM COATED ORAL at 21:04

## 2023-05-09 RX ADMIN — SERTRALINE 50 MILLIGRAM(S): 25 TABLET, FILM COATED ORAL at 17:39

## 2023-05-09 RX ADMIN — HEPARIN SODIUM 5000 UNIT(S): 5000 INJECTION INTRAVENOUS; SUBCUTANEOUS at 21:05

## 2023-05-09 RX ADMIN — ATORVASTATIN CALCIUM 20 MILLIGRAM(S): 80 TABLET, FILM COATED ORAL at 21:06

## 2023-05-09 RX ADMIN — HEPARIN SODIUM 5000 UNIT(S): 5000 INJECTION INTRAVENOUS; SUBCUTANEOUS at 05:42

## 2023-05-09 RX ADMIN — CHLORHEXIDINE GLUCONATE 1 APPLICATION(S): 213 SOLUTION TOPICAL at 05:44

## 2023-05-09 RX ADMIN — Medication 81 MILLIGRAM(S): at 17:39

## 2023-05-09 RX ADMIN — PREGABALIN 1000 MICROGRAM(S): 225 CAPSULE ORAL at 17:39

## 2023-05-09 RX ADMIN — GABAPENTIN 100 MILLIGRAM(S): 400 CAPSULE ORAL at 21:05

## 2023-05-09 RX ADMIN — Medication 650 MILLIGRAM(S): at 00:31

## 2023-05-09 RX ADMIN — GABAPENTIN 100 MILLIGRAM(S): 400 CAPSULE ORAL at 05:42

## 2023-05-09 RX ADMIN — AMLODIPINE BESYLATE 10 MILLIGRAM(S): 2.5 TABLET ORAL at 05:43

## 2023-05-09 RX ADMIN — POLYETHYLENE GLYCOL 3350 17 GRAM(S): 17 POWDER, FOR SOLUTION ORAL at 17:40

## 2023-05-09 RX ADMIN — PANTOPRAZOLE SODIUM 40 MILLIGRAM(S): 20 TABLET, DELAYED RELEASE ORAL at 05:44

## 2023-05-09 NOTE — PROGRESS NOTE ADULT - ASSESSMENT
77-year-old female with a past medical history of HTN, HLD, CKD stage 3, DM, depression, and dementia, OA, osteoporosis, chronic back pain who was brought in by EMS from [facility] for worsening back pain.     #Chronic back pain   #Severe scoliosis and degenerative changes of the spine   #Osteoporosis   - No trauma or fall, no fractures  - CT A/P showing severe generative changes and severe scoliosis   - Not on any standing pain medications at the facility   - PT--> 5 feet with rolling walker   - Outpatient Ortho  -  standing tylenol and gabapentin 100mg TID  - c/w alendronate and vit d supplementation   - needs STR placement     #AMS 2/2 to sedation- improved   - Avoid ativan   - AOx1 at baseline   - S&S--> regular with thins     #HTN  #CAD?   - c/w home losartan 50mg and norvasc  - c/w aspirin     #HLD  - c/w lipitor     #DM  - f/u a1c  - hold home metformin  - Monitor FS     #Dementia  #Depression  - c/w home aricept       #Misc  - GI ppx: PPI  - DVT ppx: heparin subq  - Diet: DASH with thins   - Code status: DNR DNI, confirmed with son at bedside, MOLST filled out   - Dispo: STR

## 2023-05-10 VITALS
SYSTOLIC BLOOD PRESSURE: 124 MMHG | TEMPERATURE: 96 F | RESPIRATION RATE: 18 BRPM | DIASTOLIC BLOOD PRESSURE: 59 MMHG | HEART RATE: 52 BPM | OXYGEN SATURATION: 98 %

## 2023-05-10 LAB
GLUCOSE BLDC GLUCOMTR-MCNC: 101 MG/DL — HIGH (ref 70–99)
GLUCOSE BLDC GLUCOMTR-MCNC: 121 MG/DL — HIGH (ref 70–99)
GLUCOSE BLDC GLUCOMTR-MCNC: 95 MG/DL — SIGNIFICANT CHANGE UP (ref 70–99)

## 2023-05-10 PROCEDURE — 99239 HOSP IP/OBS DSCHRG MGMT >30: CPT

## 2023-05-10 RX ORDER — ACETAMINOPHEN 500 MG
2 TABLET ORAL
Qty: 240 | Refills: 3
Start: 2023-05-10 | End: 2023-09-06

## 2023-05-10 RX ADMIN — Medication 650 MILLIGRAM(S): at 11:15

## 2023-05-10 RX ADMIN — PANTOPRAZOLE SODIUM 40 MILLIGRAM(S): 20 TABLET, DELAYED RELEASE ORAL at 05:45

## 2023-05-10 RX ADMIN — POLYETHYLENE GLYCOL 3350 17 GRAM(S): 17 POWDER, FOR SOLUTION ORAL at 11:16

## 2023-05-10 RX ADMIN — AMLODIPINE BESYLATE 10 MILLIGRAM(S): 2.5 TABLET ORAL at 05:45

## 2023-05-10 RX ADMIN — PREGABALIN 1000 MICROGRAM(S): 225 CAPSULE ORAL at 11:16

## 2023-05-10 RX ADMIN — Medication 81 MILLIGRAM(S): at 11:15

## 2023-05-10 RX ADMIN — HEPARIN SODIUM 5000 UNIT(S): 5000 INJECTION INTRAVENOUS; SUBCUTANEOUS at 05:47

## 2023-05-10 RX ADMIN — Medication 650 MILLIGRAM(S): at 12:38

## 2023-05-10 RX ADMIN — Medication 650 MILLIGRAM(S): at 05:43

## 2023-05-10 RX ADMIN — Medication 650 MILLIGRAM(S): at 01:43

## 2023-05-10 RX ADMIN — Medication 650 MILLIGRAM(S): at 00:53

## 2023-05-10 RX ADMIN — CHLORHEXIDINE GLUCONATE 1 APPLICATION(S): 213 SOLUTION TOPICAL at 05:45

## 2023-05-10 RX ADMIN — GABAPENTIN 100 MILLIGRAM(S): 400 CAPSULE ORAL at 05:43

## 2023-05-10 RX ADMIN — SERTRALINE 50 MILLIGRAM(S): 25 TABLET, FILM COATED ORAL at 11:16

## 2023-05-10 NOTE — DISCHARGE NOTE NURSING/CASE MANAGEMENT/SOCIAL WORK - PATIENT PORTAL LINK FT
You can access the FollowMyHealth Patient Portal offered by [facility] by registering at the following website: [email or web address]. By joining [facility]’s FollowMyHealth portal, you will also be able to view your health information using other applications (apps) compatible with our system.

## 2023-05-10 NOTE — DISCHARGE NOTE NURSING/CASE MANAGEMENT/SOCIAL WORK - NSDCPEFALRISK_GEN_ALL_CORE
For information on Fall & Injury Prevention, visit: [email or web address] OR  [email or web address] OR  https://www.cdc.gov/steadi/patient.html

## 2023-05-10 NOTE — PROGRESS NOTE ADULT - SUBJECTIVE AND OBJECTIVE BOX
COLTEN PARRISH 77y Female  MRN#: [id number]   Hospital Day: 2d      SUBJECTIVE  Patient is a 77y old Female who presents with a chief complaint of back pain (08 May 2023 16:11)  Currently admitted to medicine with the primary diagnosis of Back pain      INTERVAL HPI AND OVERNIGHT EVENTS:  Patient was examined and seen at bedside. This morning she is resting comfortably in bed and reports no issues or overnight events.      OBJECTIVE  PAST MEDICAL & SURGICAL HISTORY  Hypertension    H/O: depression    Dementia    Diabetes mellitus    Stage 3 chronic kidney disease      ALLERGIES:  No Known Allergies    MEDICATIONS:  STANDING MEDICATIONS  acetaminophen     Tablet .. 650 milliGRAM(s) Oral every 6 hours  amLODIPine   Tablet 10 milliGRAM(s) Oral daily  aspirin  chewable 81 milliGRAM(s) Oral daily  atorvastatin 20 milliGRAM(s) Oral at bedtime  chlorhexidine 2% Cloths 1 Application(s) Topical <User Schedule>  cyanocobalamin 1000 MICROGram(s) Oral daily  donepezil 10 milliGRAM(s) Oral at bedtime  gabapentin 100 milliGRAM(s) Oral three times a day  heparin   Injectable 5000 Unit(s) SubCutaneous every 8 hours  pantoprazole    Tablet 40 milliGRAM(s) Oral before breakfast  polyethylene glycol 3350 17 Gram(s) Oral daily  sertraline 50 milliGRAM(s) Oral daily    PRN MEDICATIONS      VITAL SIGNS: Last 24 Hours  T(C): 36.4 (09 May 2023 08:03), Max: 36.4 (08 May 2023 15:50)  T(F): 97.5 (09 May 2023 08:03), Max: 97.5 (08 May 2023 15:50)  HR: 54 (09 May 2023 08:03) (54 - 69)  BP: 109/56 (09 May 2023 08:03) (109/56 - 126/62)  BP(mean): --  RR: 18 (09 May 2023 08:03) (18 - 18)  SpO2: 94% (09 May 2023 08:03) (94% - 98%)    LABS:                        12.7   6.21  )-----------( 251      ( 08 May 2023 06:46 )             38.7     05-08    142  |  107  |  19  ----------------------------<  89  4.4   |  22  |  0.9    Ca    9.7      08 May 2023 06:46  Mg     2.1     05-08    TPro  5.8<L>  /  Alb  3.7  /  TBili  0.3  /  DBili  x   /  AST  20  /  ALT  11  /  AlkPhos  74  05-08        Culture - Urine (collected 07 May 2023 07:20)  Source: Clean Catch Clean Catch (Midstream)  Final Report (08 May 2023 13:40):    No growth        PHYSICAL EXAM:  CONSTITUTIONAL: No acute distress, AAOx1-2  HEAD: Atraumatic, normocephalic  EYES: EOM intact, PERRLA, conjunctiva and sclera clear  ENT: Supple, no masses, no thyromegaly, no bruits, no JVD; moist mucous membranes  PULMONARY: Clear to auscultation bilaterally; no wheezes, rales, or rhonchi  CARDIOVASCULAR: Regular rate and rhythm; no murmurs, rubs, or gallops  GASTROINTESTINAL: Soft, non-tender, non-distended; bowel sounds present  MUSCULOSKELETAL: 2+ peripheral pulses; no clubbing, no cyanosis, no edema  NEUROLOGY: non-focal  SKIN: No rashes or lesions; warm and dry    ASSESSMENT & PLAN  77-year-old female with a past medical history of HTN, HLD, CKD stage 3, DM, depression, and dementia, OA, osteoporosis, chronic back pain who was brought in by EMS from [facility] for worsening back pain.     #Chronic back pain   #Severe scoliosis and degenerative changes of the spine   #Osteoporosis   - Vitals stable  - No trauma or fall, no fractures  - PE benign   - CT A/P showing severe generative changes and severe scoliosis   - Not on any standing pain medications at the facility   Plan:  - PT--> 5 feet with rolling walker   - Outpatient Ortho  - start  with standing tylenol and gabapentin 100mg TID, can add additional medications based on pain and when patient more alert   - c/w alendronate and vit d supplementation   - needs STR placement       #AMS 2/2 to sedation- improved   - Avoid ativan   - AOx1 at baseline   - S&S--> regular with thins     #HTN  #CAD?   - c/w home losartan 50mg and norvasc  - c/w aspirin     #HLD  - c/w lipitor     #DM  - f/u a1c  - hold home metformin  - Monitor FS     #Dementia  #Depression  - c/w home aricept       #Misc  - GI ppx: PPI  - DVT ppx: heparin subq  - Diet: DASH with thins   - Code status: DNR DNI, confirmed with son at bedside, MOLST filled out   - Dispo: STR      
Patient is a 77y old  Female who presents with a chief complaint of back pain (08 May 2023 15:25)      Patient seen and examined at bedside.    ALLERGIES:  No Known Allergies    MEDICATIONS:  acetaminophen     Tablet .. 650 milliGRAM(s) Oral every 6 hours  amLODIPine   Tablet 10 milliGRAM(s) Oral daily  aspirin  chewable 81 milliGRAM(s) Oral daily  atorvastatin 20 milliGRAM(s) Oral at bedtime  chlorhexidine 2% Cloths 1 Application(s) Topical <User Schedule>  cyanocobalamin 1000 MICROGram(s) Oral daily  donepezil 10 milliGRAM(s) Oral at bedtime  gabapentin 100 milliGRAM(s) Oral three times a day  heparin   Injectable 5000 Unit(s) SubCutaneous every 8 hours  pantoprazole    Tablet 40 milliGRAM(s) Oral before breakfast  polyethylene glycol 3350 17 Gram(s) Oral daily  sertraline 50 milliGRAM(s) Oral daily    Vital Signs Last 24 Hrs  T(F): 97.5 (08 May 2023 15:50), Max: 97.5 (08 May 2023 15:50)  HR: 68 (08 May 2023 15:50) (68 - 68)  BP: 119/59 (08 May 2023 15:50) (119/59 - 161/72)  RR: 18 (08 May 2023 15:50) (18 - 18)  SpO2: 98% (08 May 2023 15:50) (97% - 98%)  I&O's Summary      PHYSICAL EXAM:  General: NAD, A/O x 1  ENT: MMM  Neck: Supple, No JVD  Lungs: Clear to auscultation bilaterally  Cardio: RRR, S1/S2, 2/6 systolic murmur   Abdomen: Soft, Nontender, Nondistended; Bowel sounds present  Extremities: No cyanosis, No edema    LABS:                        12.7   6.21  )-----------( 251      ( 08 May 2023 06:46 )             38.7     05-08    142  |  107  |  19  ----------------------------<  89  4.4   |  22  |  0.9    Ca    9.7      08 May 2023 06:46  Mg     2.1     05-08    TPro  5.8  /  Alb  3.7  /  TBili  0.3  /  DBili  x   /  AST  20  /  ALT  11  /  AlkPhos  74  -- Chol 159 mg/dL LDL -- HDL 54 mg/dL Trig 116 mg/dL              POCT Blood Glucose.: 105 mg/dL (07 May 2023 16:40)      Urinalysis Basic - ( 07 May 2023 07:20 )    Color: Colorless / Appearance: Clear / S.006 / pH: x  Gluc: x / Ketone: Negative  / Bili: Negative / Urobili: <2 mg/dL   Blood: x / Protein: Negative / Nitrite: Negative   Leuk Esterase: Negative / RBC: x / WBC x   Sq Epi: x / Non Sq Epi: x / Bacteria: x        Culture - Urine (collected 07 May 2023 07:20)  Source: Clean Catch Clean Catch (Midstream)  Final Report (08 May 2023 13:40):    No growth    Culture - Urine (collected 04 May 2023 20:24)  Source: Clean Catch Clean Catch (Midstream)  Final Report (06 May 2023 00:09):    <10,000 CFU/mL Normal Urogenital Coleen      COVID-19 PCR: NotDetec (23 @ 10:23)      RADIOLOGY & ADDITIONAL TESTS:    Care Discussed with Consultants/Other Providers: 
COLTEN PARRISH 77y Female  MRN#: [id number]   Hospital Day: 3d        SUBJECTIVE  Patient is a 77y old Female who presents with a chief complaint of back pain (09 May 2023 17:39)  Currently admitted to medicine with the primary diagnosis of Back pain      INTERVAL HPI AND OVERNIGHT EVENTS:  Patient was examined and seen at bedside. This morning she is resting comfortably in bed , confused.     OBJECTIVE  PAST MEDICAL & SURGICAL HISTORY  Hypertension    H/O: depression    Dementia    Diabetes mellitus    Stage 3 chronic kidney disease      ALLERGIES:  No Known Allergies    MEDICATIONS:  STANDING MEDICATIONS  acetaminophen     Tablet .. 650 milliGRAM(s) Oral every 6 hours  amLODIPine   Tablet 10 milliGRAM(s) Oral daily  aspirin  chewable 81 milliGRAM(s) Oral daily  atorvastatin 20 milliGRAM(s) Oral at bedtime  chlorhexidine 2% Cloths 1 Application(s) Topical <User Schedule>  cyanocobalamin 1000 MICROGram(s) Oral daily  donepezil 10 milliGRAM(s) Oral at bedtime  gabapentin 100 milliGRAM(s) Oral three times a day  heparin   Injectable 5000 Unit(s) SubCutaneous every 8 hours  pantoprazole    Tablet 40 milliGRAM(s) Oral before breakfast  polyethylene glycol 3350 17 Gram(s) Oral daily  sertraline 50 milliGRAM(s) Oral daily    PRN MEDICATIONS      VITAL SIGNS: Last 24 Hours  T(C): 35.6 (10 May 2023 07:52), Max: 36.4 (09 May 2023 15:38)  T(F): 96 (10 May 2023 07:52), Max: 97.5 (09 May 2023 15:38)  HR: 52 (10 May 2023 07:52) (52 - 73)  BP: 124/59 (10 May 2023 07:52) (110/62 - 132/64)  BP(mean): --  RR: 18 (10 May 2023 07:52) (18 - 18)  SpO2: 98% (10 May 2023 07:52) (98% - 98%)      PHYSICAL EXAM:  CONSTITUTIONAL: No acute distress, confused, AAOx1-2  HEAD: Atraumatic, normocephalic  EYES: EOM intact, PERRLA, conjunctiva and sclera clear  ENT: Supple, no masses, no thyromegaly, no bruits, no JVD; moist mucous membranes  PULMONARY: Clear to auscultation bilaterally; no wheezes, rales, or rhonchi  CARDIOVASCULAR: Regular rate and rhythm; no murmurs, rubs, or gallops  GASTROINTESTINAL: Soft, non-tender, non-distended; bowel sounds present  MUSCULOSKELETAL: 2+ peripheral pulses; no clubbing, no cyanosis, no edema  NEUROLOGY: non-focal  SKIN: No rashes or lesions; warm and dry    ASSESSMENT & PLAN  77-year-old female with a past medical history of HTN, HLD, CKD stage 3, DM, depression, and dementia, OA, osteoporosis, chronic back pain who was brought in by EMS from [facility] for worsening back pain.     #Chronic back pain   #Severe scoliosis and degenerative changes of the spine   #Osteoporosis   - Vitals stable  - No trauma or fall, no fractures  - PE benign   - CT A/P showing severe generative changes and severe scoliosis   - Not on any standing pain medications at the facility   Plan:  - PT--> 5 feet with rolling walker   - Outpatient Ortho  - start  with standing tylenol and gabapentin 100mg TID, can add additional medications based on pain and when patient more alert   - c/w alendronate and vit d supplementation   - needs STR placement     #AMS 2/2 to sedation- improved   - Avoid ativan   - AOx1 at baseline   - S&S--> regular with thins     #HTN  #CAD?   - c/w home losartan 50mg and norvasc  - c/w aspirin     #HLD  - c/w lipitor     #DM  - f/u a1c  - hold home metformin  - Monitor FS     #Dementia  #Depression  - c/w home aricept     #Misc  - GI ppx: PPI  - DVT ppx: heparin subq  - Diet: DASH with thins   - Code status: DNR DNI, confirmed with son at bedside, MOLST filled out   - Dispo: STR      
COLTEN PARRISH 77y Female  MRN#: [id number]   Hospital Day: 1d        SUBJECTIVE  Patient is a 77y old Female who presents with a chief complaint of back pain (08 May 2023 10:15)  Currently admitted to medicine with the primary diagnosis of Back pain      INTERVAL HPI AND OVERNIGHT EVENTS:  Patient was examined and seen at bedside. This morning she is resting comfortably in bed and reports no issues or overnight events.      OBJECTIVE  PAST MEDICAL & SURGICAL HISTORY  Hypertension    H/O: depression    Dementia    Diabetes mellitus    Stage 3 chronic kidney disease      ALLERGIES:  No Known Allergies    MEDICATIONS:  STANDING MEDICATIONS  acetaminophen     Tablet .. 650 milliGRAM(s) Oral every 6 hours  amLODIPine   Tablet 10 milliGRAM(s) Oral daily  aspirin  chewable 81 milliGRAM(s) Oral daily  atorvastatin 20 milliGRAM(s) Oral at bedtime  chlorhexidine 2% Cloths 1 Application(s) Topical <User Schedule>  cyanocobalamin 1000 MICROGram(s) Oral daily  donepezil 10 milliGRAM(s) Oral at bedtime  gabapentin 100 milliGRAM(s) Oral three times a day  heparin   Injectable 5000 Unit(s) SubCutaneous every 8 hours  pantoprazole    Tablet 40 milliGRAM(s) Oral before breakfast  polyethylene glycol 3350 17 Gram(s) Oral daily  sertraline 50 milliGRAM(s) Oral daily    PRN MEDICATIONS      VITAL SIGNS: Last 24 Hours  T(C): 35.8 (07 May 2023 18:36), Max: 35.8 (07 May 2023 18:36)  T(F): 96.5 (07 May 2023 18:36), Max: 96.5 (07 May 2023 18:36)  HR: --  BP: 161/72 (07 May 2023 18:36) (161/72 - 161/72)  BP(mean): --  RR: 18 (07 May 2023 18:36) (18 - 18)  SpO2: 97% (07 May 2023 18:36) (97% - 97%)    LABS:                        12.7   6.21  )-----------( 251      ( 08 May 2023 06:46 )             38.7         142  |  107  |  19  ----------------------------<  89  4.4   |  22  |  0.9    Ca    9.7      08 May 2023 06:46  Mg     2.1     -    TPro  5.8<L>  /  Alb  3.7  /  TBili  0.3  /  DBili  x   /  AST  20  /  ALT  11  /  AlkPhos  74  -      Urinalysis Basic - ( 07 May 2023 07:20 )    Color: Colorless / Appearance: Clear / S.006 / pH: x  Gluc: x / Ketone: Negative  / Bili: Negative / Urobili: <2 mg/dL   Blood: x / Protein: Negative / Nitrite: Negative   Leuk Esterase: Negative / RBC: x / WBC x   Sq Epi: x / Non Sq Epi: x / Bacteria: x    Culture - Urine (collected 07 May 2023 07:20)  Source: Clean Catch Clean Catch (Midstream)  Final Report (08 May 2023 13:40):    No growth      CARDIAC MARKERS ( 07 May 2023 05:10 )  x     / <0.01 ng/mL / x     / x     / x            PHYSICAL EXAM:  CONSTITUTIONAL: No acute distress, well-developed, well-groomed, AAOx3  HEAD: Atraumatic, normocephalic  EYES: EOM intact, PERRLA, conjunctiva and sclera clear  ENT: Supple, no masses, no thyromegaly, no bruits, no JVD; moist mucous membranes  PULMONARY: Clear to auscultation bilaterally; no wheezes, rales, or rhonchi  CARDIOVASCULAR: Regular rate and rhythm; no murmurs, rubs, or gallops  GASTROINTESTINAL: Soft, non-tender, non-distended; bowel sounds present  MUSCULOSKELETAL: 2+ peripheral pulses; no clubbing, no cyanosis, no edema  NEUROLOGY: non-focal  SKIN: No rashes or lesions; warm and dry    ASSESSMENT & PLAN  77-year-old female with a past medical history of HTN, HLD, CKD stage 3, DM, depression, and dementia, OA, osteoporosis, chronic back pain who was brought in by EMS from [facility] for worsening back pain.     #Chronic back pain   #Severe scoliosis and degenerative changes of the spine   #Osteoporosis   - Vitals stable  - No trauma or fall, no fractures  - PE benign   - CT A/P showing severe generative changes and severe scoliosis   - Not on any standing pain medications at the facility   Plan:  - PT--> 5 feet with rolling walker   - Outpatient Ortho  - start  with standing tylenol and gabapentin 100mg TID, can add additional medications based on pain and when patient more alert   - c/w alendronate and vit d supplementation   - needs STR placement       #AMS 2/2 to sedation- improved   - Avoid ativan   - AOx1 at baseline   - S&S--> regular with thins     #HTN  #CAD?   - c/w home losartan 50mg and norvasc  - c/w aspirin     #HLD  - c/w lipitor     #DM  - f/u a1c  - hold home metformin  - Monitor FS     #Dementia  #Depression  - c/w home aricept       #Misc  - GI ppx: PPI  - DVT ppx: heparin subq  - Diet: DASH with thins   - Code status: DNR DNI, confirmed with son at bedside, MOLST filled out   - Dispo: STR  
Patient is a 77y old  Female who presents with a chief complaint of back pain (09 May 2023 10:38)      Patient seen and examined at bedside.    ALLERGIES:  No Known Allergies    MEDICATIONS:  acetaminophen     Tablet .. 650 milliGRAM(s) Oral every 6 hours  amLODIPine   Tablet 10 milliGRAM(s) Oral daily  aspirin  chewable 81 milliGRAM(s) Oral daily  atorvastatin 20 milliGRAM(s) Oral at bedtime  chlorhexidine 2% Cloths 1 Application(s) Topical <User Schedule>  cyanocobalamin 1000 MICROGram(s) Oral daily  donepezil 10 milliGRAM(s) Oral at bedtime  gabapentin 100 milliGRAM(s) Oral three times a day  heparin   Injectable 5000 Unit(s) SubCutaneous every 8 hours  pantoprazole    Tablet 40 milliGRAM(s) Oral before breakfast  polyethylene glycol 3350 17 Gram(s) Oral daily  sertraline 50 milliGRAM(s) Oral daily    Vital Signs Last 24 Hrs  T(F): 97.5 (09 May 2023 15:38), Max: 97.5 (09 May 2023 08:03)  HR: 100 (09 May 2023 15:38) (54 - 100)  BP: 109/56 (09 May 2023 08:03) (109/56 - 126/62)  RR: 18 (09 May 2023 08:03) (18 - 18)  SpO2: 94% (09 May 2023 08:03) (94% - 94%)  I&O's Summary      PHYSICAL EXAM:  General: NAD, A/O x 1  ENT: MMM  Neck: Supple, No JVD  Lungs: Clear to auscultation bilaterally  Cardio: RRR, S1/S2, 2/6 systolic murmur   Abdomen: Soft, Nontender, Nondistended; Bowel sounds present  Extremities: No cyanosis, No edema    LABS:                        12.7   6.21  )-----------( 251      ( 08 May 2023 06:46 )             38.7     05-08    142  |  107  |  19  ----------------------------<  89  4.4   |  22  |  0.9    Ca    9.7      08 May 2023 06:46  Mg     2.1     05-08    TPro  5.8  /  Alb  3.7  /  TBili  0.3  /  DBili  x   /  AST  20  /  ALT  11  /  AlkPhos  74  -- Chol 159 mg/dL LDL -- HDL 54 mg/dL Trig 116 mg/dL                  Urinalysis Basic - ( 07 May 2023 07:20 )    Color: Colorless / Appearance: Clear / S.006 / pH: x  Gluc: x / Ketone: Negative  / Bili: Negative / Urobili: <2 mg/dL   Blood: x / Protein: Negative / Nitrite: Negative   Leuk Esterase: Negative / RBC: x / WBC x   Sq Epi: x / Non Sq Epi: x / Bacteria: x        Culture - Urine (collected 07 May 2023 07:20)  Source: Clean Catch Clean Catch (Midstream)  Final Report (08 May 2023 13:40):    No growth    Culture - Urine (collected 04 May 2023 20:24)  Source: Clean Catch Clean Catch (Midstream)  Final Report (06 May 2023 00:09):    <10,000 CFU/mL Normal Urogenital Coleen      COVID-19 PCR: NotDetec (23 @ 10:23)      RADIOLOGY & ADDITIONAL TESTS:    Care Discussed with Consultants/Other Providers:

## 2023-05-12 DIAGNOSIS — N18.30 CHRONIC KIDNEY DISEASE, STAGE 3 UNSPECIFIED: ICD-10-CM

## 2023-05-12 DIAGNOSIS — G89.29 OTHER CHRONIC PAIN: ICD-10-CM

## 2023-05-12 DIAGNOSIS — E11.22 TYPE 2 DIABETES MELLITUS WITH DIABETIC CHRONIC KIDNEY DISEASE: ICD-10-CM

## 2023-05-12 DIAGNOSIS — M19.90 UNSPECIFIED OSTEOARTHRITIS, UNSPECIFIED SITE: ICD-10-CM

## 2023-05-12 DIAGNOSIS — F32.A DEPRESSION, UNSPECIFIED: ICD-10-CM

## 2023-05-12 DIAGNOSIS — Z20.822 CONTACT WITH AND (SUSPECTED) EXPOSURE TO COVID-19: ICD-10-CM

## 2023-05-12 DIAGNOSIS — I12.9 HYPERTENSIVE CHRONIC KIDNEY DISEASE WITH STAGE 1 THROUGH STAGE 4 CHRONIC KIDNEY DISEASE, OR UNSPECIFIED CHRONIC KIDNEY DISEASE: ICD-10-CM

## 2023-05-12 DIAGNOSIS — Z79.84 LONG TERM (CURRENT) USE OF ORAL HYPOGLYCEMIC DRUGS: ICD-10-CM

## 2023-05-12 DIAGNOSIS — R41.82 ALTERED MENTAL STATUS, UNSPECIFIED: ICD-10-CM

## 2023-05-12 DIAGNOSIS — T42.75XA ADVERSE EFFECT OF UNSPECIFIED ANTIEPILEPTIC AND SEDATIVE-HYPNOTIC DRUGS, INITIAL ENCOUNTER: ICD-10-CM

## 2023-05-12 DIAGNOSIS — F41.9 ANXIETY DISORDER, UNSPECIFIED: ICD-10-CM

## 2023-05-12 DIAGNOSIS — M41.9 SCOLIOSIS, UNSPECIFIED: ICD-10-CM

## 2023-05-12 DIAGNOSIS — I25.10 ATHEROSCLEROTIC HEART DISEASE OF NATIVE CORONARY ARTERY WITHOUT ANGINA PECTORIS: ICD-10-CM

## 2023-05-12 DIAGNOSIS — Z66 DO NOT RESUSCITATE: ICD-10-CM

## 2023-05-12 DIAGNOSIS — M81.0 AGE-RELATED OSTEOPOROSIS WITHOUT CURRENT PATHOLOGICAL FRACTURE: ICD-10-CM

## 2023-05-12 DIAGNOSIS — E78.5 HYPERLIPIDEMIA, UNSPECIFIED: ICD-10-CM

## 2023-05-12 DIAGNOSIS — F03.911 UNSPECIFIED DEMENTIA, UNSPECIFIED SEVERITY, WITH AGITATION: ICD-10-CM
